# Patient Record
Sex: MALE | Race: WHITE | NOT HISPANIC OR LATINO | Employment: OTHER | URBAN - METROPOLITAN AREA
[De-identification: names, ages, dates, MRNs, and addresses within clinical notes are randomized per-mention and may not be internally consistent; named-entity substitution may affect disease eponyms.]

---

## 2017-08-11 ENCOUNTER — TRANSCRIBE ORDERS (OUTPATIENT)
Dept: ADMINISTRATIVE | Facility: HOSPITAL | Age: 47
End: 2017-08-11

## 2017-08-11 DIAGNOSIS — M25.552 LEFT HIP PAIN: Primary | ICD-10-CM

## 2017-08-17 ENCOUNTER — HOSPITAL ENCOUNTER (OUTPATIENT)
Dept: RADIOLOGY | Facility: HOSPITAL | Age: 47
Discharge: HOME | End: 2017-08-17
Payer: COMMERCIAL

## 2017-08-17 DIAGNOSIS — M25.552 LEFT HIP PAIN: ICD-10-CM

## 2017-08-17 PROCEDURE — 20610 DRAIN/INJ JOINT/BURSA W/O US: CPT

## 2017-08-17 PROCEDURE — 77002 NEEDLE LOCALIZATION BY XRAY: CPT

## 2017-08-17 RX ORDER — METHYLPREDNISOLONE ACETATE 80 MG/ML
80 INJECTION, SUSPENSION INTRA-ARTICULAR; INTRALESIONAL; INTRAMUSCULAR; SOFT TISSUE
Status: COMPLETED | OUTPATIENT
Start: 2017-08-17 | End: 2017-08-17

## 2017-08-17 RX ORDER — LIDOCAINE HYDROCHLORIDE 10 MG/ML
20 INJECTION, SOLUTION INFILTRATION; PERINEURAL
Status: COMPLETED | OUTPATIENT
Start: 2017-08-17 | End: 2017-08-17

## 2017-08-17 RX ORDER — BUPIVACAINE HYDROCHLORIDE 2.5 MG/ML
30 INJECTION, SOLUTION EPIDURAL; INFILTRATION; INTRACAUDAL
Status: COMPLETED | OUTPATIENT
Start: 2017-08-17 | End: 2017-08-17

## 2017-08-17 RX ADMIN — IOHEXOL 3 ML: 300 INJECTION, SOLUTION INTRAVENOUS at 15:00

## 2017-08-17 RX ADMIN — LIDOCAINE HYDROCHLORIDE 2 ML: 10 INJECTION, SOLUTION INFILTRATION; PERINEURAL at 15:00

## 2017-08-17 RX ADMIN — BUPIVACAINE HYDROCHLORIDE 2 ML: 2.5 INJECTION, SOLUTION EPIDURAL; INFILTRATION; INTRACAUDAL at 15:00

## 2017-08-17 RX ADMIN — METHYLPREDNISOLONE ACETATE 80 MG: 80 INJECTION, SUSPENSION INTRA-ARTICULAR; INTRALESIONAL; INTRAMUSCULAR; SOFT TISSUE at 15:00

## 2017-08-24 ENCOUNTER — ALLSCRIPTS OFFICE VISIT (OUTPATIENT)
Dept: OTHER | Facility: OTHER | Age: 47
End: 2017-08-24

## 2017-09-05 ENCOUNTER — ANESTHESIA EVENT (OUTPATIENT)
Dept: PERIOP | Facility: HOSPITAL | Age: 47
End: 2017-09-05
Payer: COMMERCIAL

## 2017-09-06 ENCOUNTER — ANESTHESIA (OUTPATIENT)
Dept: PERIOP | Facility: HOSPITAL | Age: 47
End: 2017-09-06
Payer: COMMERCIAL

## 2017-09-06 ENCOUNTER — HOSPITAL ENCOUNTER (OUTPATIENT)
Facility: HOSPITAL | Age: 47
Setting detail: OUTPATIENT SURGERY
Discharge: HOME | End: 2017-09-06
Attending: SURGERY | Admitting: SURGERY
Payer: COMMERCIAL

## 2017-09-06 VITALS
TEMPERATURE: 97 F | HEIGHT: 69 IN | WEIGHT: 150 LBS | BODY MASS INDEX: 22.22 KG/M2 | RESPIRATION RATE: 15 BRPM | OXYGEN SATURATION: 97 % | SYSTOLIC BLOOD PRESSURE: 119 MMHG | HEART RATE: 49 BPM | DIASTOLIC BLOOD PRESSURE: 80 MMHG

## 2017-09-06 DIAGNOSIS — K40.20 BILATERAL INGUINAL HERNIA WITHOUT OBSTRUCTION OR GANGRENE: ICD-10-CM

## 2017-09-06 PROCEDURE — 88302 TISSUE EXAM BY PATHOLOGIST: CPT | Performed by: SURGERY

## 2017-09-06 PROCEDURE — C1781 MESH (IMPLANTABLE): HCPCS | Performed by: SURGERY

## 2017-09-06 DEVICE — BARD MESH PERFIX PLUG, SMALL
Type: IMPLANTABLE DEVICE | Site: INGUINAL | Status: FUNCTIONAL
Brand: BARD MESH PERFIX PLUG

## 2017-09-06 RX ORDER — LIDOCAINE HYDROCHLORIDE 10 MG/ML
INJECTION, SOLUTION INFILTRATION; PERINEURAL AS NEEDED
Status: DISCONTINUED | OUTPATIENT
Start: 2017-09-06 | End: 2017-09-06 | Stop reason: SURG

## 2017-09-06 RX ORDER — MIDAZOLAM HYDROCHLORIDE 1 MG/ML
INJECTION INTRAMUSCULAR; INTRAVENOUS AS NEEDED
Status: DISCONTINUED | OUTPATIENT
Start: 2017-09-06 | End: 2017-09-06 | Stop reason: SURG

## 2017-09-06 RX ORDER — ONDANSETRON 2 MG/ML
4 INJECTION INTRAMUSCULAR; INTRAVENOUS EVERY 4 HOURS PRN
Status: DISCONTINUED | OUTPATIENT
Start: 2017-09-06 | End: 2017-09-06 | Stop reason: HOSPADM

## 2017-09-06 RX ORDER — GABAPENTIN 100 MG/1
100 CAPSULE ORAL DAILY PRN
COMMUNITY

## 2017-09-06 RX ORDER — ALPRAZOLAM 0.25 MG/1
0.12 TABLET ORAL
COMMUNITY

## 2017-09-06 RX ORDER — HYDROCODONE BITARTRATE AND ACETAMINOPHEN 5; 325 MG/1; MG/1
1 TABLET ORAL EVERY 4 HOURS PRN
Status: DISCONTINUED | OUTPATIENT
Start: 2017-09-06 | End: 2017-09-06 | Stop reason: HOSPADM

## 2017-09-06 RX ORDER — SODIUM CHLORIDE, SODIUM LACTATE, POTASSIUM CHLORIDE, CALCIUM CHLORIDE 600; 310; 30; 20 MG/100ML; MG/100ML; MG/100ML; MG/100ML
80 INJECTION, SOLUTION INTRAVENOUS CONTINUOUS
Status: DISCONTINUED | OUTPATIENT
Start: 2017-09-06 | End: 2017-09-06 | Stop reason: HOSPADM

## 2017-09-06 RX ORDER — FENTANYL CITRATE/PF 50 MCG/ML
50 SYRINGE (ML) INJECTION
Status: DISCONTINUED | OUTPATIENT
Start: 2017-09-06 | End: 2017-09-06 | Stop reason: HOSPADM

## 2017-09-06 RX ORDER — ONDANSETRON 2 MG/ML
INJECTION INTRAMUSCULAR; INTRAVENOUS AS NEEDED
Status: DISCONTINUED | OUTPATIENT
Start: 2017-09-06 | End: 2017-09-06 | Stop reason: SURG

## 2017-09-06 RX ORDER — SODIUM CHLORIDE, SODIUM LACTATE, POTASSIUM CHLORIDE, CALCIUM CHLORIDE 600; 310; 30; 20 MG/100ML; MG/100ML; MG/100ML; MG/100ML
75 INJECTION, SOLUTION INTRAVENOUS CONTINUOUS
Status: DISCONTINUED | OUTPATIENT
Start: 2017-09-06 | End: 2017-09-06 | Stop reason: HOSPADM

## 2017-09-06 RX ORDER — HYDROCODONE BITARTRATE AND ACETAMINOPHEN 5; 325 MG/1; MG/1
1 TABLET ORAL EVERY 4 HOURS PRN
Qty: 30 TABLET | Refills: 0 | Status: SHIPPED | OUTPATIENT
Start: 2017-09-06

## 2017-09-06 RX ORDER — MORPHINE SULFATE 4 MG/ML
2 INJECTION, SOLUTION INTRAMUSCULAR; INTRAVENOUS EVERY 2 HOUR PRN
Status: DISCONTINUED | OUTPATIENT
Start: 2017-09-06 | End: 2017-09-06 | Stop reason: HOSPADM

## 2017-09-06 RX ORDER — ACETAMINOPHEN 325 MG/1
650 TABLET ORAL EVERY 6 HOURS PRN
Status: DISCONTINUED | OUTPATIENT
Start: 2017-09-06 | End: 2017-09-06 | Stop reason: HOSPADM

## 2017-09-06 RX ORDER — FENTANYL CITRATE 50 UG/ML
INJECTION, SOLUTION INTRAMUSCULAR; INTRAVENOUS AS NEEDED
Status: DISCONTINUED | OUTPATIENT
Start: 2017-09-06 | End: 2017-09-06 | Stop reason: SURG

## 2017-09-06 RX ORDER — PROPOFOL 10 MG/ML
INJECTION, EMULSION INTRAVENOUS AS NEEDED
Status: DISCONTINUED | OUTPATIENT
Start: 2017-09-06 | End: 2017-09-06 | Stop reason: SURG

## 2017-09-06 RX ORDER — GLYCOPYRROLATE 0.2 MG/ML
INJECTION INTRAMUSCULAR; INTRAVENOUS AS NEEDED
Status: DISCONTINUED | OUTPATIENT
Start: 2017-09-06 | End: 2017-09-06 | Stop reason: SURG

## 2017-09-06 RX ORDER — SODIUM CHLORIDE 9 MG/ML
125 INJECTION, SOLUTION INTRAVENOUS CONTINUOUS
Status: DISCONTINUED | OUTPATIENT
Start: 2017-09-06 | End: 2017-09-06 | Stop reason: HOSPADM

## 2017-09-06 RX ORDER — ROCURONIUM BROMIDE 10 MG/ML
INJECTION, SOLUTION INTRAVENOUS AS NEEDED
Status: DISCONTINUED | OUTPATIENT
Start: 2017-09-06 | End: 2017-09-06 | Stop reason: SURG

## 2017-09-06 RX ORDER — MEPERIDINE HYDROCHLORIDE 50 MG/ML
12.5 INJECTION INTRAMUSCULAR; INTRAVENOUS; SUBCUTANEOUS AS NEEDED
Status: DISCONTINUED | OUTPATIENT
Start: 2017-09-06 | End: 2017-09-06 | Stop reason: HOSPADM

## 2017-09-06 RX ORDER — KETOROLAC TROMETHAMINE 30 MG/ML
INJECTION, SOLUTION INTRAMUSCULAR; INTRAVENOUS AS NEEDED
Status: DISCONTINUED | OUTPATIENT
Start: 2017-09-06 | End: 2017-09-06 | Stop reason: SURG

## 2017-09-06 RX ADMIN — FENTANYL CITRATE 50 MCG: 50 INJECTION INTRAMUSCULAR; INTRAVENOUS at 11:33

## 2017-09-06 RX ADMIN — PROPOFOL 200 MG: 10 INJECTION, EMULSION INTRAVENOUS at 10:05

## 2017-09-06 RX ADMIN — LIDOCAINE HYDROCHLORIDE 100 MG: 10 INJECTION, SOLUTION INFILTRATION; PERINEURAL at 10:05

## 2017-09-06 RX ADMIN — NEOSTIGMINE METHYLSULFATE 4 MG: 1 INJECTION, SOLUTION INTRAMUSCULAR; INTRAVENOUS; SUBCUTANEOUS at 11:02

## 2017-09-06 RX ADMIN — SODIUM CHLORIDE 125 ML/HR: 0.9 INJECTION, SOLUTION INTRAVENOUS at 08:16

## 2017-09-06 RX ADMIN — CEFAZOLIN SODIUM 1000 MG: 1 SOLUTION INTRAVENOUS at 10:12

## 2017-09-06 RX ADMIN — KETOROLAC TROMETHAMINE 30 MG: 30 INJECTION, SOLUTION INTRAMUSCULAR at 10:39

## 2017-09-06 RX ADMIN — HYDROCODONE BITARTRATE AND ACETAMINOPHEN 1 TABLET: 5; 325 TABLET ORAL at 12:51

## 2017-09-06 RX ADMIN — GLYCOPYRROLATE 0.8 MG: 0.2 INJECTION, SOLUTION INTRAMUSCULAR; INTRAVENOUS at 11:02

## 2017-09-06 RX ADMIN — MIDAZOLAM HYDROCHLORIDE 2 MG: 1 INJECTION, SOLUTION INTRAMUSCULAR; INTRAVENOUS at 10:02

## 2017-09-06 RX ADMIN — FENTANYL CITRATE 50 MCG: 50 INJECTION INTRAMUSCULAR; INTRAVENOUS at 11:40

## 2017-09-06 RX ADMIN — SODIUM CHLORIDE: 0.9 INJECTION, SOLUTION INTRAVENOUS at 10:41

## 2017-09-06 RX ADMIN — FENTANYL CITRATE 50 MCG: 50 INJECTION INTRAMUSCULAR; INTRAVENOUS at 11:46

## 2017-09-06 RX ADMIN — ROCURONIUM BROMIDE 50 MG: 10 INJECTION, SOLUTION INTRAVENOUS at 10:07

## 2017-09-06 RX ADMIN — DEXAMETHASONE SODIUM PHOSPHATE 4 MG: 10 INJECTION INTRAMUSCULAR; INTRAVENOUS at 10:39

## 2017-09-06 RX ADMIN — FENTANYL CITRATE 100 MCG: 50 INJECTION, SOLUTION INTRAMUSCULAR; INTRAVENOUS at 10:05

## 2017-09-06 RX ADMIN — SODIUM CHLORIDE: 0.9 INJECTION, SOLUTION INTRAVENOUS at 11:11

## 2017-09-06 RX ADMIN — ONDANSETRON HYDROCHLORIDE 4 MG: 2 INJECTION, SOLUTION INTRAVENOUS at 11:00

## 2017-09-19 ENCOUNTER — ALLSCRIPTS OFFICE VISIT (OUTPATIENT)
Dept: OTHER | Facility: OTHER | Age: 47
End: 2017-09-19

## 2017-11-13 ENCOUNTER — ALLSCRIPTS OFFICE VISIT (OUTPATIENT)
Dept: OTHER | Facility: OTHER | Age: 47
End: 2017-11-13

## 2017-11-13 ENCOUNTER — TRANSCRIBE ORDERS (OUTPATIENT)
Dept: ADMINISTRATIVE | Facility: HOSPITAL | Age: 47
End: 2017-11-13

## 2017-11-13 ENCOUNTER — HOSPITAL ENCOUNTER (OUTPATIENT)
Dept: RADIOLOGY | Facility: HOSPITAL | Age: 47
Discharge: HOME | End: 2017-11-13
Payer: COMMERCIAL

## 2017-11-13 DIAGNOSIS — M54.89 OTHER BACK PAIN, UNSPECIFIED CHRONICITY: Primary | ICD-10-CM

## 2017-11-13 DIAGNOSIS — M54.89 OTHER BACK PAIN, UNSPECIFIED CHRONICITY: ICD-10-CM

## 2017-11-13 PROCEDURE — 72110 X-RAY EXAM L-2 SPINE 4/>VWS: CPT

## 2017-11-14 NOTE — PROGRESS NOTES
Assessment    1  History of bilateral inguinal hernias (V45 89) (Z98 890,Z87 19)    Discussion/Summary    30-year-old male status post bilateral inguinal hernia  His exam is negative, despite a little bit of left groin pain  normal activities  Reassurance given  back on an as-needed basis  Chief Complaint  Patient is here for a post op sx Bilateral Hernia repair- 9/6/2017  Patient said he still have some pain and its an annoying pain  Patient said he also notice some swelling on the left quadrant  Post-Op  HPI: 30-year-old male who is status post bilateral inguinal hernia repairs, open with mesh on September 6, 2017  Left was fixed with a plug and patch and right with just a flat mesh  Left was more substantial in size preoperatively  He has gradually increased his activity but is still doing significantly less exercising than his baseline  notes a little bit of cutaneous numbness in his groin bilaterally and comes in for some reassurance that it is okay for him to continue his normal exercise regimen  is eating, drinking, urinating, moving his bowels without difficulty  Review of Systems   Constitutional: no fever-- and-- no chills  Eyes: no eye pain  ENT: no nosebleeds-- and-- no nasal discharge  Cardiovascular: no chest pain  Respiratory: no cough  Gastrointestinal: no nausea  Genitourinary: no urinary hesitancy  Musculoskeletal: no joint swelling  Integumentary: no itching  Neurological: no numbness  Psychiatric: no anxiety  Endocrine: no muscle weakness  Hematologic/Lymphatic: no tendency for easy bleeding  Active Problems  1  Anxiety (300 00) (F41 9)    Social History     · Alcohol drinker (V49 89) (Z78 9)   · Former smoker (Y08 90) (K71 528)   · No illicit drug use  The social history was reviewed and updated today  Current Meds   1  ALPRAZolam 0 25 MG Oral Tablet; TAKE 1 TABLET EVERY 6 HOURS AS NEEDED FOR ANXIETY; Therapy: (Jesús De La Garza) to Recorded   2  Gabapentin 300 MG Oral Capsule; Therapy: [id number] to Recorded    Allergies  1  No Known Drug Allergies    Vitals   Recorded: [id number] 02:58PM   Temperature 98 F, Tympanic   Systolic 777, RUE, Sitting   Diastolic 77, RUE, Sitting   Height 5 ft 9 in   Weight 148 lb 4 oz   BMI Calculated 21 89   BSA Calculated 1 82       Physical Exam   Constitutional 22-year-old well-appearing thin male  No acute distress  Ambulating without difficulty throughout the office  Pulmonary  Respiratory effort: No increased work of breathing or signs of respiratory distress  Auscultation of lungs: Clear to auscultation, equal breath sounds bilaterally, no wheezes, no rales, no rhonci  Cardiovascular  Auscultation of heart: Normal rate and rhythm, normal S1 and S2, without murmurs  Musculoskeletal  Gait and station: Normal    Skin  Skin and subcutaneous tissue: Normal without rashes or lesions          Signatures   Electronically signed by : Hien Barrios [facility]; Nov 13 2017  3:26PM EST                       (Author)    Electronically signed by : Thomas Bell MD; Nov 13 2017  3:28PM EST                       (Author)

## 2017-12-04 ENCOUNTER — TRANSCRIBE ORDERS (OUTPATIENT)
Dept: ADMINISTRATIVE | Facility: HOSPITAL | Age: 47
End: 2017-12-04

## 2017-12-04 DIAGNOSIS — D51.8 OTHER VITAMIN B12 DEFICIENCY ANEMIA: ICD-10-CM

## 2017-12-04 DIAGNOSIS — Z79.899 NEED FOR PROPHYLACTIC CHEMOTHERAPY: ICD-10-CM

## 2017-12-04 DIAGNOSIS — M79.10 MYALGIA: ICD-10-CM

## 2017-12-04 DIAGNOSIS — M54.9 MID BACK PAIN: Primary | ICD-10-CM

## 2017-12-04 DIAGNOSIS — E86.0 DEHYDRATION: ICD-10-CM

## 2017-12-04 DIAGNOSIS — M54.5 ACUTE MIDLINE LOW BACK PAIN, WITH SCIATICA PRESENCE UNSPECIFIED: ICD-10-CM
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
## 2017-12-04 DIAGNOSIS — E55.9 AVITAMINOSIS D: ICD-10-CM

## 2017-12-04 DIAGNOSIS — E78.5 HYPERLIPIDEMIA, UNSPECIFIED HYPERLIPIDEMIA TYPE: ICD-10-CM

## 2017-12-04 DIAGNOSIS — S30.860A NONVENOMOUS INSECT BITE OF BUTTOCK WITHOUT INFECTION, INITIAL ENCOUNTER: ICD-10-CM

## 2017-12-04 DIAGNOSIS — R53.83 OTHER FATIGUE: ICD-10-CM

## 2017-12-13 ENCOUNTER — HOSPITAL ENCOUNTER (OUTPATIENT)
Dept: RADIOLOGY | Age: 47
Discharge: HOME | End: 2017-12-13
Payer: COMMERCIAL

## 2017-12-13 ENCOUNTER — GENERIC CONVERSION - ENCOUNTER (OUTPATIENT)
Dept: OTHER | Facility: OTHER | Age: 47
End: 2017-12-13

## 2017-12-13 DIAGNOSIS — M54.5 ACUTE MIDLINE LOW BACK PAIN, WITH SCIATICA PRESENCE UNSPECIFIED: ICD-10-CM

## 2017-12-13 DIAGNOSIS — M54.9 MID BACK PAIN: ICD-10-CM

## 2017-12-13 PROCEDURE — 72148 MRI LUMBAR SPINE W/O DYE: CPT

## 2017-12-13 PROCEDURE — 72146 MRI CHEST SPINE W/O DYE: CPT

## 2017-12-28 ENCOUNTER — APPOINTMENT (OUTPATIENT)
Dept: LAB | Facility: CLINIC | Age: 47
End: 2017-12-28
Payer: COMMERCIAL
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
## 2017-12-28 DIAGNOSIS — D51.8 OTHER VITAMIN B12 DEFICIENCY ANEMIA: ICD-10-CM

## 2017-12-28 DIAGNOSIS — E78.5 HYPERLIPIDEMIA, UNSPECIFIED HYPERLIPIDEMIA TYPE: ICD-10-CM

## 2017-12-28 DIAGNOSIS — M54.9 MID BACK PAIN: ICD-10-CM

## 2017-12-28 DIAGNOSIS — M79.10 MYALGIA: ICD-10-CM

## 2017-12-28 DIAGNOSIS — E86.0 DEHYDRATION: ICD-10-CM

## 2017-12-28 DIAGNOSIS — M54.5 ACUTE MIDLINE LOW BACK PAIN, WITH SCIATICA PRESENCE UNSPECIFIED: ICD-10-CM

## 2017-12-28 DIAGNOSIS — S30.860A NONVENOMOUS INSECT BITE OF BUTTOCK WITHOUT INFECTION, INITIAL ENCOUNTER: ICD-10-CM

## 2017-12-28 DIAGNOSIS — E55.9 AVITAMINOSIS D: ICD-10-CM

## 2017-12-28 DIAGNOSIS — R53.83 OTHER FATIGUE: ICD-10-CM

## 2017-12-28 DIAGNOSIS — Z79.899 NEED FOR PROPHYLACTIC CHEMOTHERAPY: ICD-10-CM

## 2017-12-28 LAB
25(OH)D3 SERPL-MCNC: 12.8 NG/ML (ref 30–100)
ALBUMIN SERPL BCP-MCNC: 4.1 G/DL (ref 3.5–5)
ALP SERPL-CCNC: 55 U/L (ref 46–116)
ALT SERPL W P-5'-P-CCNC: 37 U/L (ref 12–78)
ANION GAP SERPL CALCULATED.3IONS-SCNC: 3 MMOL/L (ref 4–13)
AST SERPL W P-5'-P-CCNC: 23 U/L (ref 5–45)
BASOPHILS # BLD AUTO: 0.02 THOUSANDS/ΜL (ref 0–0.1)
BASOPHILS NFR BLD AUTO: 0 % (ref 0–1)
BILIRUB SERPL-MCNC: 0.9 MG/DL (ref 0.2–1)
BUN SERPL-MCNC: 14 MG/DL (ref 5–25)
CALCIUM SERPL-MCNC: 9.3 MG/DL (ref 8.3–10.1)
CHLORIDE SERPL-SCNC: 107 MMOL/L (ref 100–108)
CHOLEST SERPL-MCNC: 192 MG/DL (ref 50–200)
CO2 SERPL-SCNC: 31 MMOL/L (ref 21–32)
CREAT SERPL-MCNC: 1.23 MG/DL (ref 0.6–1.3)
EOSINOPHIL # BLD AUTO: 0.35 THOUSAND/ΜL (ref 0–0.61)
EOSINOPHIL NFR BLD AUTO: 5 % (ref 0–6)
ERYTHROCYTE [DISTWIDTH] IN BLOOD BY AUTOMATED COUNT: 12.3 % (ref 11.6–15.1)
ERYTHROCYTE [SEDIMENTATION RATE] IN BLOOD: 0 MM/HOUR (ref 0–10)
GFR SERPL CREATININE-BSD FRML MDRD: 70 ML/MIN/1.73SQ M
GLUCOSE P FAST SERPL-MCNC: 91 MG/DL (ref 65–99)
HCT VFR BLD AUTO: 46.8 % (ref 36.5–49.3)
HDLC SERPL-MCNC: 99 MG/DL (ref 40–60)
HGB BLD-MCNC: 15.7 G/DL (ref 12–17)
LDLC SERPL CALC-MCNC: 81 MG/DL (ref 0–100)
LYMPHOCYTES # BLD AUTO: 1.78 THOUSANDS/ΜL (ref 0.6–4.47)
LYMPHOCYTES NFR BLD AUTO: 25 % (ref 14–44)
MCH RBC QN AUTO: 29.6 PG (ref 26.8–34.3)
MCHC RBC AUTO-ENTMCNC: 33.5 G/DL (ref 31.4–37.4)
MCV RBC AUTO: 88 FL (ref 82–98)
MONOCYTES # BLD AUTO: 0.81 THOUSAND/ΜL (ref 0.17–1.22)
MONOCYTES NFR BLD AUTO: 11 % (ref 4–12)
NEUTROPHILS # BLD AUTO: 4.24 THOUSANDS/ΜL (ref 1.85–7.62)
NEUTS SEG NFR BLD AUTO: 59 % (ref 43–75)
PLATELET # BLD AUTO: 194 THOUSANDS/UL (ref 149–390)
PMV BLD AUTO: 11.4 FL (ref 8.9–12.7)
POTASSIUM SERPL-SCNC: 4.7 MMOL/L (ref 3.5–5.3)
PROT SERPL-MCNC: 7.2 G/DL (ref 6.4–8.2)
RBC # BLD AUTO: 5.3 MILLION/UL (ref 3.88–5.62)
SODIUM SERPL-SCNC: 141 MMOL/L (ref 136–145)
T3FREE SERPL-MCNC: 3.34 PG/ML (ref 2.3–4.2)
T4 FREE SERPL-MCNC: 1.12 NG/DL (ref 0.76–1.46)
TRIGL SERPL-MCNC: 59 MG/DL
TSH SERPL DL<=0.05 MIU/L-ACNC: 2.06 UIU/ML (ref 0.36–3.74)
URATE SERPL-MCNC: 4.7 MG/DL (ref 4.2–8)
VIT B12 SERPL-MCNC: 444 PG/ML (ref 100–900)
WBC # BLD AUTO: 7.2 THOUSAND/UL (ref 4.31–10.16)

## 2017-12-28 PROCEDURE — 84439 ASSAY OF FREE THYROXINE: CPT

## 2017-12-28 PROCEDURE — 82607 VITAMIN B-12: CPT

## 2017-12-28 PROCEDURE — 85652 RBC SED RATE AUTOMATED: CPT

## 2017-12-28 PROCEDURE — 36415 COLL VENOUS BLD VENIPUNCTURE: CPT

## 2017-12-28 PROCEDURE — 80061 LIPID PANEL: CPT

## 2017-12-28 PROCEDURE — 84443 ASSAY THYROID STIM HORMONE: CPT

## 2017-12-28 PROCEDURE — 84550 ASSAY OF BLOOD/URIC ACID: CPT

## 2017-12-28 PROCEDURE — 85025 COMPLETE CBC W/AUTO DIFF WBC: CPT

## 2017-12-28 PROCEDURE — 84481 FREE ASSAY (FT-3): CPT

## 2017-12-28 PROCEDURE — 82306 VITAMIN D 25 HYDROXY: CPT

## 2017-12-28 PROCEDURE — 86618 LYME DISEASE ANTIBODY: CPT

## 2017-12-28 PROCEDURE — 80053 COMPREHEN METABOLIC PANEL: CPT

## 2017-12-29 LAB
B BURGDOR IGG SER IA-ACNC: 0.08
B BURGDOR IGM SER IA-ACNC: 0.69

## 2018-01-13 VITALS
TEMPERATURE: 97.8 F | SYSTOLIC BLOOD PRESSURE: 118 MMHG | BODY MASS INDEX: 21.45 KG/M2 | WEIGHT: 144.8 LBS | HEIGHT: 69 IN | HEART RATE: 80 BPM | DIASTOLIC BLOOD PRESSURE: 78 MMHG

## 2018-01-14 VITALS
WEIGHT: 148.25 LBS | SYSTOLIC BLOOD PRESSURE: 120 MMHG | BODY MASS INDEX: 21.96 KG/M2 | TEMPERATURE: 98 F | DIASTOLIC BLOOD PRESSURE: 77 MMHG | HEIGHT: 69 IN

## 2018-01-14 NOTE — MISCELLANEOUS
Message   Recorded as Task   Date: 09/07/2017 10:26 AM, Created By: Patricio Turner   Task Name: Follow Up   [facility]   Regarding Patient: Dell Hanna, Status: Active   CommentMaylin Rubina - 07 Sep 2017 10:26 AM     TASK CREATED  Called pt post B/L Ing  Hernia repair from yesterday, message left to return call for update  Patricio Turner - 07 Sep 2017 10:52 AM     TASK EDITED  Spoke w/ pt and he is doing well, c/o pain but is taking his vicodin q4 and also applying ice  States he noticed edema yesterday but today this has gotten better  Is voiding but does feel he is not empting his bladder completely  Will continue to force fluids and gradually build up his diet  Advised to ambulate frequently and deep breath throughout the day  Has not moved bowels yet and is concerned w/ trying to evacuate, advised to take the stool softners to aid with this as well as combat constipation w/ taking the medications  Pt stated the vicodin helps take some of the edge off but does not relieve the pain entirely, advised to try one extra strength apap along w/ the vicodin to see if this helps better and if unable to aid with pain releif to contact office  Has not moved bowels yet has no N/V  Drsg is dry and intact and post op f/u is set up for 9/19  Pathology pending at this time  Fernando Canchola - 11 Sep 2017 2:24 PM     TASK EDITED  Received on pathology results and waiting for verification  Jackie Lu - 15 Sep 2017 3:45 PM     TASK EDITED  Provider has verified pathology     please contact patient with normal pathology results  Valeria Clarke - 18 Sep 2017 8:30 AM     TASK EDITED  Left message to return call  Patricio Turner - 18 Sep 2017 8:45 AM     TASK EDITED  Shanna Avilez w/ pt and made aware of pathology results  Active Problems    1  Anxiety (300 00) (F41 9)   2  Bilateral inguinal hernia (550 92) (K40 20)    Current Meds   1   ALPRAZolam 0 25 MG Oral Tablet; TAKE 1 TABLET EVERY 6 HOURS AS NEEDED FOR   ANXIETY; Therapy: (Brennon Levy) to Recorded   2  Gabapentin 300 MG Oral Capsule; Therapy: [id number] to Recorded    Allergies    1   No Known Drug Allergies    Signatures   Electronically signed by : Ivana Ellis, ; Sep 18 2017  8:45AM EST                       (Author)

## 2018-01-15 ENCOUNTER — ALLSCRIPTS OFFICE VISIT (OUTPATIENT)
Dept: OTHER | Facility: OTHER | Age: 48
End: 2018-01-15

## 2018-01-15 DIAGNOSIS — M54.50 LOW BACK PAIN: ICD-10-CM

## 2018-01-16 NOTE — PROGRESS NOTES
Assessment   1  Chronic left-sided low back pain without sciatica (724 2,338 29) (M54 5,G89 29)    Plan   Chronic left-sided low back pain without sciatica    · Meloxicam 7 5 MG Oral Tablet (Mobic); TAKE 1 TABLET DAILY AS NEEDED   · *1 - [facility]  Evaluate and treat left piriforimis muscle    stretching and strengthening  Status: Active  Requested for: [id number]  Care Summary provided  : Yes    Discussion/Summary   Patient discussion: discussed with the patient  Patient seen and examined by Dr Shane Kinney and myself  MRI of the lumbar and thoracic spine reviewed and shows lumbar DDD without significant disc bulging  His pain likely stems from piriformis syndrome on the left side  We will refer the patient to physical therapy for evaluation and treatment  No surgical intervention at this time  Start Mobic PRN  He can follow-up with us PRN  Any questions call the office  Patient is able to Self-Care  The treatment plan was reviewed with the patient/guardian  The patient/guardian understands and agrees with the treatment plan      Chief Complaint   1  Back Pain  LBP      History of Present Illness   HPI: The patient is a 41year old male who presents for an initial evaluation with Dr Shane Kinney  Today he presents as a self-referral for chronic long-standing left lower back pain and upper thoracic muscle soreness  There is no radiation of pain to the upper or lower extremities, no associated numbness/tingling or weakness  Denies bowel or bladder incontinence  There was no injury, no history of back surgery  This has been worsening over the last several months, and physical labor makes it worse, yoga makes it better  He has seen a chiropractor years ago with some improvement  Back Pain: CAROLINA ZHANG presents with complaints of back pain        Associated symptoms include spasm-- and-- stiffness, but-- no fever,-- no night sweats,-- no leg numbness,-- no arm weakness,-- no leg weakness,-- no urinary incontinence,-- no fecal incontinence-- and-- no urinary retention  Review of Systems        Constitutional: no fever-- and-- no chills  Cardiovascular: No complaints of chest pain, no palpitations, no leg claudication or lower extremity edema  Respiratory: No complaints of shortness of breath, no wheezing, no cough  Gastrointestinal: No complaints of abdominal pain, no constipation, no nausea or vomiting, no diarrhea or bloody stools  Genitourinary: no incontinence  Musculoskeletal: arthralgias-- and-- joint stiffness, but-- no limb pain  Integumentary: No complaints of skin rash or lesion, no itching or dry skin, no skin wounds  Neurological: no numbness-- and-- no tingling  ROS reviewed  Active Problems   1  Anxiety (300 00) (F41 9)    Past Medical History    · History of bilateral inguinal hernias (V45 89) (Z98 890,Z87 19)   · History of chronic back pain (V13 59) (Z87 39)   · History of psoriasis (V13 3) (Z87 2)   · History of sciatica (V12 49) (Z86 69)   · History of urinary frequency (V13 09) (G72 063)   · History of Osteoarthritis (715 90) (M19 90)     The active problems and past medical history were reviewed and updated today  Surgical History    · History of Oral Surgery Tooth Extraction [place] Tooth   · History of Surgery Vas Deferens Vasectomy     The surgical history was reviewed and updated today  Family History   Maternal Half Sister    · Family history of malignant neoplasm of breast (V16 3) (Z80 3)     The family history was reviewed and updated today  Social History    · Alcohol drinker (V49 89) (Z78 9)   · Former smoker (B59 23) (Q78 378)   · No illicit drug use  The social history was reviewed and updated today  Current Meds    1  ALPRAZolam 0 25 MG Oral Tablet; TAKE 1 TABLET EVERY 6 HOURS AS NEEDED FOR     ANXIETY; Therapy: (Claudeen Hare) to Recorded   2   Gabapentin 300 MG Oral Capsule; Therapy: [id number] to Recorded     The medication list was reviewed and updated today  Allergies   1  No Known Drug Allergies    Vitals   Signs   Heart Rate: 69  Systolic: 844  Diastolic: 79  Height: 5 ft 9 in  Weight: 148 lb   BMI Calculated: 21 86  BSA Calculated: 1 82    Physical Exam        Constitutional - General appearance: Normal       Musculoskeletal - Gait and station: Normal -- Inspection/palpation of joints, bones, and muscles: Normal -- Muscle strength/tone: Normal -- Upper extremity compartments: Normal -- Lower extremity compartments: Normal       Cardiovascular - Pulses: Normal       Respiratory - Lungs - Clear to auscultation bilaterally, no rales, no rhonci, no wheezes  Abdomen - Abdomen - Soft, nontender, nondistended  Lymphatic - Palpation of lymph nodes in other areas: Normal       Skin - Skin and subcutaneous tissue: Normal       Neurologic - Sensation: Normal -- Upper extremity peripheral neuro exam: Normal -- Lower extremity peripheral neuro exam: Normal       Psychiatric - Orientation to person, place, and time: Normal -- Mood and affect: Normal       Eyes      Conjunctiva and lids: Normal        Pupils and irises: Normal        Free Text - NAD  Gait is normal  Inspection no open wounds or erythema  NTTP over the thoracic and lumbosacral region, NTTP surrounding paraspinal musculature  Negative straight leg raise bilaterally  Negative VANESSA bilaterally  No pain with internal rotation of the hips  Strength is 5/5 BL LE, L2-S1  Sensation equal and intact  Feet are warm and well perfused  No calf tenderness or pitting edema  Attending Note   Attending Note [facility]: Level of Participation: I was present in clinic and examined the patient  Patient's History: Patient presents for evaluation of chronic upper middle and lower back pain  He also reports left buttock pain  He denies any specific radiation distally into his legs  Denies any weakness   Does not report incontinence of bowel or bladder  He has never tried formal physical therapy program  Several years ago he did visit with a chiropractor with transient relief of symptoms  His pain is not debilitating  He has difficulty with prolonged sitting particularly with the left buttock  He has received an MRI of the thoracic and lumbar spine and has been diagnosed with DDD without significant stenosis  Key Parts of the Exam: Awake alert and oriented x3  Affect is appropriate  Gait is normal  No significant reproducible tenderness to palpation over the thoracolumbar spine or piriformis fossa  Negative modified straight leg raise bilaterally  Negative Tam's test bilaterally  5/5 strength L2-S1 bilaterally  and lumbar spine MRIs demonstrate mild DDD no significant stenosis  He does have some loss of lordosis of the lumbar spine  Diagnosis and Plan: Mild DDD in the thoracic and lumbar spine  No stenosis  No spondylolisthesis  Myofascial pain syndrome  No gross neurological deficits  Physical therapy  Mobic  Follow-up p r n        Signatures    Electronically signed by : Tana Bonilla, [facility]; Mt 15 2018 10:45AM EST                       (Author)     Electronically signed by : JOSE Grayson ; Mt 15 2018 11:28AM EST                       (Author)

## 2018-01-22 VITALS
TEMPERATURE: 98 F | RESPIRATION RATE: 16 BRPM | HEART RATE: 80 BPM | WEIGHT: 143.13 LBS | SYSTOLIC BLOOD PRESSURE: 120 MMHG | BODY MASS INDEX: 21.2 KG/M2 | HEIGHT: 69 IN | DIASTOLIC BLOOD PRESSURE: 77 MMHG

## 2018-01-22 VITALS
HEIGHT: 69 IN | DIASTOLIC BLOOD PRESSURE: 79 MMHG | WEIGHT: 148 LBS | BODY MASS INDEX: 21.92 KG/M2 | SYSTOLIC BLOOD PRESSURE: 116 MMHG | HEART RATE: 69 BPM

## 2018-01-30 ENCOUNTER — EVALUATION (OUTPATIENT)
Dept: PHYSICAL THERAPY | Facility: CLINIC | Age: 48
End: 2018-01-30
Payer: COMMERCIAL

## 2018-01-30 DIAGNOSIS — G89.29 CHRONIC LEFT-SIDED LOW BACK PAIN WITHOUT SCIATICA: ICD-10-CM

## 2018-01-30 DIAGNOSIS — M54.50 CHRONIC LEFT-SIDED LOW BACK PAIN WITHOUT SCIATICA: ICD-10-CM

## 2018-01-30 DIAGNOSIS — M54.50 LOW BACK PAIN: Primary | ICD-10-CM

## 2018-01-30 PROCEDURE — G8981 BODY POS CURRENT STATUS: HCPCS | Performed by: PHYSICAL THERAPIST

## 2018-01-30 PROCEDURE — 97162 PT EVAL MOD COMPLEX 30 MIN: CPT | Performed by: PHYSICAL THERAPIST

## 2018-01-30 PROCEDURE — G8982 BODY POS GOAL STATUS: HCPCS | Performed by: PHYSICAL THERAPIST

## 2018-01-30 NOTE — PROGRESS NOTES
PT Evaluation     Today's date: 2018  Patient name: Wanda Diaz  : 1970  MRN: [id number]  Referring provider: Velasquez Lyle MD  Dx:   Encounter Diagnosis   Name Primary?  Low back pain Yes                  Assessment  Impairments: abnormal coordination, impaired physical strength and pain with function    Assessment details: Pt presents with signs and symptoms synonymous of admitting diagnosis as well as mechanical assessment of flexion bias  Pt presents with pain, decreased strength, decreased range, flexibility, as well as tolerance to activity and postural awareness  Pt would benefit skilled PT intervention in order to address these impairments in order to be able to perform all desired activities with minimal to nil symptom exacerbation  Thank you very much for this referral      Understanding of Dx/Px/POC: good   Prognosis: good    Goals  STG 4:  Decrease pain at worst to 5  Improve range to SLR to 60  Improve strength to TA draw 10" x 2, hip to 4-  Independent with HEP  LTG 8:  Decrease pain at worst to 2/10  Improve range to Hip Ir/ER to equal that of R  Improve strength to TA draw 10" x 4, hip to 4/5      Able to perform all desired activities with minimal to nil symptom exacerbation      Plan    Planned modality interventions: cryotherapy, TENS and thermotherapy: hydrocollator packs  Planned therapy interventions: manual therapy, abdominal trunk stabilization, body mechanics training, flexibility, functional ROM exercises, home exercise program, graded exercise, therapeutic training, therapeutic exercise, therapeutic activities, stretching, strengthening, patient education and neuromuscular re-education  Frequency: 2x week  Duration in weeks: 8  Treatment plan discussed with: patient        Subjective Evaluation    History of Present Illness  Date of onset: 2018  Mechanism of injury: Pt is a 42 yo male who presents today stating he has a long history of low back pain, but recently he stated he has recently had a flare up a few months ago and chose to see a physician  Pt reports that he was diagnosed with Piriformis syndrome after imagery also demonstrated degenerative properties of his low back  Pt reports that he does find relief from relief  Pt reports that his pain levels fluctuate from nothing at all and up to 7/10 within the low back and L hip during higher level of activity consisting heavier activities such as yard and contractor work  Pt reports that he does not have change in bowel or bladder, no radicular symptoms as well as numbness or tingling  Pt reports that his goals are to improve his symptoms and be able to stay active by learning some new exercises  Quality of life: fair    Pain  Current pain ratin  At best pain ratin  At worst pain ratin  Quality: burning, dull ache, pressure, sharp and discomfort  Relieving factors: change in position and heat  Aggravating factors: nothing, sitting and standing  Progression: no change      Diagnostic Tests  X-ray: abnormal (decreased lordosis)  MRI studies: abnormal (degenerative properties )        Objective     Passive Range of Motion   Left Hip   Flexion: 105 degrees   Extension: 10 degrees   External rotation (90/90): 35 degrees   Internal rotation (90/90): 45 degrees     Right Hip   Flexion: 120 degrees   Extension: 10 degrees   External rotation (90/90): 35 degrees   Internal rotation (90/90): 45 degrees     Additional Passive Range of Motion Details  Forward head, rounded shoulders, decrease Lordosis  B/L Sensation intact to L3,4,5,S1,S2  Repeated motion   Flex 65 mild L posterior knee irritation  Ext 15 some pain low back  SB R 15 mild pull on L Side  SB L 15 pinch on L side  LE Screen Strong and Painless B/L   Hip 3+ on L flex, Abd, 4- Add and Ext, 5/5 on R  Hip R 5/5 on both sides  TA draw 7" before form failure      Joint Mobility Grade 5 L4-5-S1, Grade 4 L1-3  Palpation Pain with L L3,4,5, S1, pain along GT , mild Ic  Sts: SLR + L, 55, (+) Distraction B/L, + FADIR L, + Brian B/L   (-) ELY B/L            Precautions: Nil, P P 1 Unit max    Daily Treatment Diary     Manual  1/30            R hip Distraction Grade 2-4             R hip PROM Flex/IR/ER             DTM to Glut Med region L                                           Exercise Diary              TA Draw PPT 10" x 10            Seated Flexion to pball (nv) 6" 10-15            ITB and Ham ST L 30" x 4            Piriformis ST L 30" x 4            Quad UE/LE Alternate             Pball sit up + reverse             Squat trial             Gastroc ST with Wedge B/L             Multi Rots RTB/GTB                                                                                                                                                               Modalities              CP/HP prn

## 2018-02-19 NOTE — PROGRESS NOTES
PT Discharge    Today's date: 2018  Patient name: Kaci Welch  : 1970  MRN: [id number]  Referring provider: Tabatha Castillo MD  Dx:   Encounter Diagnosis     ICD-10-CM    1  Low back pain M54 5 [facility]     PT plan of care cert/re-cert   2  Chronic left-sided low back pain without sciatica M54 5     G89 29        Start Time: 0910  Stop Time: 1000  Total time in clinic (min): 50 minutes    Assessment/Plan Pt has not bee back since his Ie which was on 18  Pt's chart will be dc in compliance of facility policy as all charts are dc within 30 days of last scheduled visit        Subjective    Objective    Flowsheet Rows    Flowsheet Row Most Recent Value   PT/OT G-Codes   Current Score  70   Projected Score  75   FOTO information reviewed  Yes   Assessment Type  Evaluation   G code set  Changing & Maintaining Body Position   Changing and Maintaining Body Position Current Status ()  CI   Changing and Maintaining Body Position Goal Status ()  CH

## 2018-04-05 ENCOUNTER — TELEPHONE (OUTPATIENT)
Dept: SURGERY | Facility: CLINIC | Age: 48
End: 2018-04-05

## 2018-04-05 NOTE — TELEPHONE ENCOUNTER
Pt calling had LIH surgery in September of 2017  States he continues with pain and edema  Pain ranges any where from 5-8 and the edema has never gone away  Requested appt for re-check, offered appt this week pt declined and appt was set up for 4/10 at his request  Pt stated he has spoken with his pcp and was told that he should f/u with surgeon

## 2018-04-06 RX ORDER — GABAPENTIN 300 MG/1
CAPSULE ORAL
COMMUNITY
Start: 2017-08-24

## 2018-04-06 RX ORDER — MELOXICAM 7.5 MG/1
1 TABLET ORAL DAILY PRN
COMMUNITY
Start: 2018-01-15

## 2018-04-06 RX ORDER — ALPRAZOLAM 0.25 MG/1
1 TABLET ORAL EVERY 6 HOURS PRN
COMMUNITY
End: 2018-08-20 | Stop reason: SDUPTHER

## 2018-04-06 RX ORDER — TRAMADOL HYDROCHLORIDE 50 MG/1
50 TABLET ORAL EVERY 8 HOURS
COMMUNITY

## 2018-04-06 RX ORDER — ALPRAZOLAM 0.25 MG/1
0.25 TABLET ORAL DAILY
COMMUNITY
End: 2018-04-06 | Stop reason: SDUPTHER

## 2018-04-10 ENCOUNTER — OFFICE VISIT (OUTPATIENT)
Dept: SURGERY | Facility: CLINIC | Age: 48
End: 2018-04-10
Payer: COMMERCIAL

## 2018-04-10 VITALS
DIASTOLIC BLOOD PRESSURE: 80 MMHG | BODY MASS INDEX: 21.18 KG/M2 | TEMPERATURE: 98.8 F | WEIGHT: 143 LBS | HEIGHT: 69 IN | HEART RATE: 60 BPM | RESPIRATION RATE: 16 BRPM | SYSTOLIC BLOOD PRESSURE: 110 MMHG

## 2018-04-10 DIAGNOSIS — M54.10 RADICULOPATHY, UNSPECIFIED SPINAL REGION: ICD-10-CM

## 2018-04-10 DIAGNOSIS — R10.32 LEFT GROIN PAIN: Primary | ICD-10-CM

## 2018-04-10 PROCEDURE — 99213 OFFICE O/P EST LOW 20 MIN: CPT | Performed by: PHYSICIAN ASSISTANT

## 2018-04-10 NOTE — LETTER
April 10, 2018     Hannah Meredith, [address] [place] [id number]    Patient: Jerson Powers   YOB: 1970   Date of Visit: 4/10/2018       Dear Dr Rusty Cancino: Thank you for referring Jerson Powers to me for evaluation  Below are my notes for this consultation  If you have questions, please do not hesitate to call me  I look forward to following your patient along with you  Sincerely,        Christian Rodrigez PA-C        CC: No Recipients  Myra Gabrielle  4/10/2018  3:12 PM  Sign at close encounter  Assessment/Plan:   Jerson Powers is a 57 y o male who is here for left groin pain   Bilateral open inguinal hernia repairs with mesh September 2017    Continued left groin pain that radiates into thigh, worse with activity    On physical exam no recurrent hernia  Left groin tender to palpitation  Impression:  Radiculopathy to left groin    Plan:  Continue with low-impact activity  Consider physical therapy evaluation and treatment  Trial of gabapentin which was prescribed for chronic pain  Follow-up in 4-6 months  Considered follow up orthopedic evaluation for chronic back pain and hip pain  Preoperative Clearance: None            ______________________________________________________  CC:Edema (Abdominal)    HPI:  Jerson Powers is a 57 y o male who was referred for evaluation of Edema (Abdominal)    Patient is status post bilateral inguinal hernia repair with mesh in September 2017  Patient with continued left groin pain which has not changed from before surgery  Pain is worse with activity  Pain radiates into inner thigh and down low to knee  Pain is constant  Pain is relieved by rest and tramadol  Currently left groin pain         ROS:  General ROS: negative  negative for - chills, fatigue, fever or night sweats, weight loss  Respiratory ROS: no cough, shortness of breath, or wheezing  Cardiovascular ROS: no chest pain or dyspnea on exertion  Genito-Urinary ROS: no dysuria, trouble voiding, or hematuria  Musculoskeletal ROS: negative for - gait disturbance, joint pain or muscle pain  Neurological ROS: no TIA or stroke symptoms  abdominal pain and see HPI  Skin ROS: no new rashes or lesions   Lymphatic ROS: no new adenopathy noted by pt  GYN ROS: see HPI, no new GYN history or bleeding noted  Psy ROS: no new mental or behavioral disturbances       There is no problem list on file for this patient  Allergies:  Patient has no known allergies  Current Outpatient Prescriptions:     ALPRAZolam (XANAX) 0 25 mg tablet, Take 0 125 mg by mouth daily at bedtime as needed for sleep, Disp: , Rfl:     ALPRAZolam (XANAX) 0 25 mg tablet, Take 1 tablet by mouth every 6 (six) hours as needed, Disp: , Rfl:     gabapentin (NEURONTIN) 100 mg capsule, Take 100 mg by mouth daily as needed Unsure of dose, Disp: , Rfl:     gabapentin (NEURONTIN) 300 mg capsule, Take by mouth, Disp: , Rfl:     HYDROcodone-acetaminophen (NORCO) 5-325 mg per tablet, Take 1 tablet by mouth every 4 (four) hours as needed for pain for up to 30 doses Earliest Fill Date: 9/6/17 Max Daily Amount: 6 tablets, Disp: 30 tablet, Rfl: 0    meloxicam (MOBIC) 7 5 mg tablet, Take 1 tablet by mouth daily as needed, Disp: , Rfl:     traMADol (ULTRAM) 50 mg tablet, Take 50 mg by mouth every 8 (eight) hours, Disp: , Rfl:     Past Medical History:   Diagnosis Date    Sciatica        Past Surgical History:   Procedure Laterality Date    [place] REPAIR ING HERNIA,5+Y/O,REDUCIBL Bilateral 9/6/2017    Procedure: REPAIR HERNIA INGUINAL BILATERAL  WITH MESH;  Surgeon: Esperanza Ulloa MD;  Location: [facility] OR;  Service: General       History reviewed  No pertinent family history  reports that he has quit smoking  He has never used smokeless tobacco  He reports that he drinks alcohol  He reports that he does not use drugs      Labs:   Lab Results   Component Value Date    WBC 7 20 12/28/2017 HGB 15 7 12/28/2017    HCT 46 8 12/28/2017    MCV 88 12/28/2017     12/28/2017     Lab Results   Component Value Date     12/28/2017    K 4 7 12/28/2017     12/28/2017    CO2 31 12/28/2017    ANIONGAP 3 (L) 12/28/2017    BUN 14 12/28/2017    CREATININE 1 23 12/28/2017    GLUF 91 12/28/2017    CALCIUM 9 3 12/28/2017    AST 23 12/28/2017    ALT 37 12/28/2017    ALKPHOS 55 12/28/2017    PROT 7 2 12/28/2017    BILITOT 0 90 12/28/2017    EGFR 70 12/28/2017         Imaging: No new pertinent imaging studies  PHYSICAL EXAM  General Appearance:    Alert, cooperative, no distress   Head:    Normocephalic without obvious abnormality   Eyes:    PERRL, conjunctiva/corneas clear, EOM's intact        Neck:   Supple, no adenopathy, no JVD   Back:     Symmetric, no spinal or CVA tenderness   Lungs:     Clear to auscultation bilaterally, no wheezing or rhonchi   Heart:    Regular rate and rhythm, S1 and S2 normal, no murmur   Abdomen:    soft NTND, +BS, tender suprpubic and left groin, no hernia noted  Healing scars b/l    Extremities:   Extremities normal  No clubbing, cyanosis or edema   Psych:   Normal Affect, AOx3  Neurologic:  Skin:   CNII-XII intact  Strength symmetric, speech intact    Warm, dry, intact, no visible rashes or lesions                       Some portions of this record may have been generated with voice recognition software  There may be translation, syntax,  or grammatical errors  Occasional wrong word or "sound-a-like" substitutions may have occurred due to the inherent limitations of the voice recognition software  Read the chart carefully and recognize, using context, where substitutions may have occurred  If you have any questions, please contact the dictating provider for clarification or correction, as needed  This encounter has been coded by a non-certified coder         Renata Mays PA-C    Date: 4/10/2018 Time: 3:06 PM

## 2018-04-10 NOTE — PROGRESS NOTES
Assessment/Plan:   Eder Maher is a 57 y o male who is here for left groin pain   Bilateral open inguinal hernia repairs with mesh September 2017    Continued left groin pain that radiates into thigh, worse with activity    On physical exam no recurrent hernia  Left groin tender to palpitation  Impression:  Radiculopathy to left groin    Plan:  Continue with low-impact activity  Consider physical therapy evaluation and treatment  Trial of gabapentin which was prescribed for chronic pain  Follow-up in 4-6 months  Considered follow up orthopedic evaluation for chronic back pain and hip pain  Preoperative Clearance: None            ______________________________________________________  CC:Edema (Abdominal)    HPI:  Eder Maher is a 57 y o male who was referred for evaluation of Edema (Abdominal)    Patient is status post bilateral inguinal hernia repair with mesh in September 2017  Patient with continued left groin pain which has not changed from before surgery  Pain is worse with activity  Pain radiates into inner thigh and down low to knee  Pain is constant  Pain is relieved by rest and tramadol  Currently left groin pain  ROS:  General ROS: negative  negative for - chills, fatigue, fever or night sweats, weight loss  Respiratory ROS: no cough, shortness of breath, or wheezing  Cardiovascular ROS: no chest pain or dyspnea on exertion  Genito-Urinary ROS: no dysuria, trouble voiding, or hematuria  Musculoskeletal ROS: negative for - gait disturbance, joint pain or muscle pain  Neurological ROS: no TIA or stroke symptoms  abdominal pain and see HPI  Skin ROS: no new rashes or lesions   Lymphatic ROS: no new adenopathy noted by pt  GYN ROS: see HPI, no new GYN history or bleeding noted  Psy ROS: no new mental or behavioral disturbances       There is no problem list on file for this patient  Allergies:  Patient has no known allergies        Current Outpatient Prescriptions:   ALPRAZolam (XANAX) 0 25 mg tablet, Take 0 125 mg by mouth daily at bedtime as needed for sleep, Disp: , Rfl:     ALPRAZolam (XANAX) 0 25 mg tablet, Take 1 tablet by mouth every 6 (six) hours as needed, Disp: , Rfl:     gabapentin (NEURONTIN) 100 mg capsule, Take 100 mg by mouth daily as needed Unsure of dose, Disp: , Rfl:     gabapentin (NEURONTIN) 300 mg capsule, Take by mouth, Disp: , Rfl:     HYDROcodone-acetaminophen (NORCO) 5-325 mg per tablet, Take 1 tablet by mouth every 4 (four) hours as needed for pain for up to 30 doses Earliest Fill Date: 9/6/17 Max Daily Amount: 6 tablets, Disp: 30 tablet, Rfl: 0    meloxicam (MOBIC) 7 5 mg tablet, Take 1 tablet by mouth daily as needed, Disp: , Rfl:     traMADol (ULTRAM) 50 mg tablet, Take 50 mg by mouth every 8 (eight) hours, Disp: , Rfl:     Past Medical History:   Diagnosis Date    Sciatica        Past Surgical History:   Procedure Laterality Date    [place] REPAIR ING HERNIA,5+Y/O,REDUCIBL Bilateral 9/6/2017    Procedure: REPAIR HERNIA INGUINAL BILATERAL  WITH MESH;  Surgeon: Rosangela Gallegos MD;  Location: [facility] OR;  Service: General       History reviewed  No pertinent family history  reports that he has quit smoking  He has never used smokeless tobacco  He reports that he drinks alcohol  He reports that he does not use drugs  Labs:   Lab Results   Component Value Date    WBC 7 20 12/28/2017    HGB 15 7 12/28/2017    HCT 46 8 12/28/2017    MCV 88 12/28/2017     12/28/2017     Lab Results   Component Value Date     12/28/2017    K 4 7 12/28/2017     12/28/2017    CO2 31 12/28/2017    ANIONGAP 3 (L) 12/28/2017    BUN 14 12/28/2017    CREATININE 1 23 12/28/2017    GLUF 91 12/28/2017    CALCIUM 9 3 12/28/2017    AST 23 12/28/2017    ALT 37 12/28/2017    ALKPHOS 55 12/28/2017    PROT 7 2 12/28/2017    BILITOT 0 90 12/28/2017    EGFR 70 12/28/2017         Imaging: No new pertinent imaging studies           PHYSICAL EXAM  General Appearance:    Alert, cooperative, no distress   Head:    Normocephalic without obvious abnormality   Eyes:    PERRL, conjunctiva/corneas clear, EOM's intact        Neck:   Supple, no adenopathy, no JVD   Back:     Symmetric, no spinal or CVA tenderness   Lungs:     Clear to auscultation bilaterally, no wheezing or rhonchi   Heart:    Regular rate and rhythm, S1 and S2 normal, no murmur   Abdomen:    soft NTND, +BS, tender suprpubic and left groin, no hernia noted  Healing scars b/l    Extremities:   Extremities normal  No clubbing, cyanosis or edema   Psych:   Normal Affect, AOx3  Neurologic:  Skin:   CNII-XII intact  Strength symmetric, speech intact    Warm, dry, intact, no visible rashes or lesions                       Some portions of this record may have been generated with voice recognition software  There may be translation, syntax,  or grammatical errors  Occasional wrong word or "sound-a-like" substitutions may have occurred due to the inherent limitations of the voice recognition software  Read the chart carefully and recognize, using context, where substitutions may have occurred  If you have any questions, please contact the dictating provider for clarification or correction, as needed  This encounter has been coded by a non-certified coder         Ruby Cuevas PA-C    Date: 4/10/2018 Time: 3:06 PM

## 2018-08-06 ENCOUNTER — OFFICE VISIT (OUTPATIENT)
Dept: SURGERY | Facility: CLINIC | Age: 48
End: 2018-08-06
Payer: COMMERCIAL

## 2018-08-06 VITALS
HEIGHT: 69 IN | SYSTOLIC BLOOD PRESSURE: 116 MMHG | WEIGHT: 139 LBS | DIASTOLIC BLOOD PRESSURE: 68 MMHG | BODY MASS INDEX: 20.59 KG/M2 | HEART RATE: 60 BPM

## 2018-08-06 DIAGNOSIS — G89.29 CHRONIC LEFT-SIDED LOW BACK PAIN WITHOUT SCIATICA: Primary | ICD-10-CM

## 2018-08-06 DIAGNOSIS — M54.50 CHRONIC LEFT-SIDED LOW BACK PAIN WITHOUT SCIATICA: Primary | ICD-10-CM

## 2018-08-06 PROCEDURE — 99212 OFFICE O/P EST SF 10 MIN: CPT | Performed by: SURGERY

## 2018-08-06 NOTE — PROGRESS NOTES
Assessment/Plan:   Kamron Nicole is a 57 y o male who is here for The encounter diagnosis was Chronic left-sided low back pain without sciatica  Concern for lipoma of back compressing on his nerve    Patient feels he has paraspinal mass that can be contributing to his chronic back pain and pain that radiates around groin  Patient is frustrated that this pain is affecting his activity of daily living  Patient had a left inguinal hernia repair and continues to have pain and swelling in that area    Plan:  Unable to palpate mass or lipoma  Feel that this is too deep and to close to spinal cord that it would not be something that General surgery would explore that area for  Recommended evaluation by pain specialist for possible epidural or facet injections  Offered patient CT scan of lumbar region of back and of groin to look for any masses or fluid collections  ______________________________________________________  CC:No chief complaint on file  Naman Parmar HPI:  Kamron Nicole is a 57 y o male who was referred for evaluation of No chief complaint on file       Currently low back pain and associated mass concern for lipoma    Patient with chronic back pain in which she has been evaluated by multiple physicians and gone through physical therapy  Patient read an article about a lipoma of the back and feels that he has 1 that is compressing on his nerves causing him pain  Patient also continues with left groin pain since left inguinal hernia repair   Patient states that his pain affects his activity of daily living, he is unable to exercise  Pain can be debilitating at times  Denies weakness, numbness tingling in legs       ROS:  General ROS: negative  negative for - chills, fatigue, fever or night sweats, weight loss  Respiratory ROS: no cough, shortness of breath, or wheezing  Cardiovascular ROS: no chest pain or dyspnea on exertion  Genito-Urinary ROS: no dysuria, trouble voiding, or hematuria  Musculoskeletal ROS: negative for - gait disturbance, joint pain or muscle pain  Neurological ROS: no TIA or stroke symptoms    {back pain and left groin pain and see HPI    Skin ROS: no new rashes or lesions   Lymphatic ROS: no new adenopathy noted by pt  GYN ROS: see HPI, no new GYN history or bleeding noted  Psy ROS: no new mental or behavioral disturbances       Patient Active Problem List   Diagnosis    Left groin pain    Chronic left-sided low back pain         Allergies:  Patient has no known allergies  Current Outpatient Prescriptions:     ALPRAZolam (XANAX) 0 25 mg tablet, Take 0 125 mg by mouth daily at bedtime as needed for sleep, Disp: , Rfl:     HYDROcodone-acetaminophen (NORCO) 5-325 mg per tablet, Take 1 tablet by mouth every 4 (four) hours as needed for pain for up to 30 doses Earliest Fill Date: 9/6/17 Max Daily Amount: 6 tablets, Disp: 30 tablet, Rfl: 0    traMADol (ULTRAM) 50 mg tablet, Take 50 mg by mouth every 8 (eight) hours, Disp: , Rfl:     ALPRAZolam (XANAX) 0 25 mg tablet, Take 1 tablet by mouth every 6 (six) hours as needed, Disp: , Rfl:     gabapentin (NEURONTIN) 100 mg capsule, Take 100 mg by mouth daily as needed Unsure of dose, Disp: , Rfl:     gabapentin (NEURONTIN) 300 mg capsule, Take by mouth, Disp: , Rfl:     meloxicam (MOBIC) 7 5 mg tablet, Take 1 tablet by mouth daily as needed, Disp: , Rfl:     Past Medical History:   Diagnosis Date    Sciatica        Past Surgical History:   Procedure Laterality Date    [place] REPAIR ING HERNIA,5+Y/O,REDUCIBL Bilateral 9/6/2017    Procedure: REPAIR HERNIA INGUINAL BILATERAL  WITH MESH;  Surgeon: Mohan Clayton MD;  Location: [facility];  Service: General       No family history on file  reports that he has quit smoking  He has never used smokeless tobacco  He reports that he drinks alcohol  He reports that he does not use drugs      Labs:   Lab Results   Component Value Date    WBC 7 20 12/28/2017    HGB 15 7 12/28/2017    HCT 46 8 12/28/2017    MCV 88 12/28/2017     12/28/2017     Lab Results   Component Value Date     12/28/2017    K 4 7 12/28/2017     12/28/2017    CO2 31 12/28/2017    ANIONGAP 3 (L) 12/28/2017    BUN 14 12/28/2017    CREATININE 1 23 12/28/2017    GLUF 91 12/28/2017    CALCIUM 9 3 12/28/2017    AST 23 12/28/2017    ALT 37 12/28/2017    ALKPHOS 55 12/28/2017    PROT 7 2 12/28/2017    BILITOT 0 90 12/28/2017    EGFR 70 12/28/2017         Imaging: No new pertinent imaging studies  PHYSICAL EXAM    /68 (BP Location: Right arm, Patient Position: Sitting, Cuff Size: Adult)   Pulse 60   Ht 5' 9" (1 753 m)   Wt 63 kg (139 lb)   BMI 20 53 kg/m²   General appearance: alert and oriented, in no acute distress  Back: no skin lesions, erythema, or scars, symmetric, no curvature  ROM normal  No CVA tenderness  Extremities: extremities normal, warm and well-perfused; no cyanosis, clubbing, or edema  Skin: Skin color, texture, turgor normal  No rashes or lesions            Some portions of this record may have been generated with voice recognition software  There may be translation, syntax,  or grammatical errors  Occasional wrong word or "sound-a-like" substitutions may have occurred due to the inherent limitations of the voice recognition software  Read the chart carefully and recognize, using context, where substitutions may have occurred  If you have any questions, please contact the dictating provider for clarification or correction, as needed  This encounter has been coded by a non-certified coder         Clarisa Calix MD    Date: 8/6/2018 Time: 3:43 PM

## 2018-08-06 NOTE — LETTER
August 6, 2018     [address] [place] [address]    Patient: Javy Corcoran   YOB: 1970   Date of Visit: 8/6/2018       Dear Dr Masterson July: Thank you for referring Javy Corcoran to me for evaluation  Below are my notes for this consultation  If you have questions, please do not hesitate to call me  I look forward to following your patient along with you  Sincerely,        Corina Eisenmenger, MD        CC: No Recipients  Valdene Pass  8/6/2018  3:49 PM  Sign at close encounter  Assessment/Plan:   Javy Corcoran is a 57 y o male who is here for The encounter diagnosis was Chronic left-sided low back pain without sciatica  Concern for lipoma of back compressing on his nerve    Patient feels he has paraspinal mass that can be contributing to his chronic back pain and pain that radiates around groin  Patient is frustrated that this pain is affecting his activity of daily living  Patient had a left inguinal hernia repair and continues to have pain and swelling in that area    Plan:  Unable to palpate mass or lipoma  Feel that this is too deep and to close to spinal cord that it would not be something that General surgery would explore that area for  Recommended evaluation by pain specialist for possible epidural or facet injections  Offered patient CT scan of lumbar region of back and of groin to look for any masses or fluid collections  ______________________________________________________  CC:No chief complaint on file  Nadiya Schmidt HPI:  Javy Corcoran is a 57 y o male who was referred for evaluation of No chief complaint on file       Currently low back pain and associated mass concern for lipoma    Patient with chronic back pain in which she has been evaluated by multiple physicians and gone through physical therapy  Patient read an article about a lipoma of the back and feels that he has 1 that is compressing on his nerves causing him pain  Patient also continues with left groin pain since left inguinal hernia repair   Patient states that his pain affects his activity of daily living, he is unable to exercise  Pain can be debilitating at times  Denies weakness, numbness tingling in legs  ROS:  General ROS: negative  negative for - chills, fatigue, fever or night sweats, weight loss  Respiratory ROS: no cough, shortness of breath, or wheezing  Cardiovascular ROS: no chest pain or dyspnea on exertion  Genito-Urinary ROS: no dysuria, trouble voiding, or hematuria  Musculoskeletal ROS: negative for - gait disturbance, joint pain or muscle pain  Neurological ROS: no TIA or stroke symptoms    { back pain and left groin pain and see HPI    Skin ROS: no new rashes or lesions   Lymphatic ROS: no new adenopathy noted by pt  GYN ROS: see HPI, no new GYN history or bleeding noted  Psy ROS: no new mental or behavioral disturbances       Patient Active Problem List   Diagnosis    Left groin pain    Chronic left-sided low back pain         Allergies:  Patient has no known allergies        Current Outpatient Prescriptions:     ALPRAZolam (XANAX) 0 25 mg tablet, Take 0 125 mg by mouth daily at bedtime as needed for sleep, Disp: , Rfl:     HYDROcodone-acetaminophen (NORCO) 5-325 mg per tablet, Take 1 tablet by mouth every 4 (four) hours as needed for pain for up to 30 doses Earliest Fill Date: 9/6/17 Max Daily Amount: 6 tablets, Disp: 30 tablet, Rfl: 0    traMADol (ULTRAM) 50 mg tablet, Take 50 mg by mouth every 8 (eight) hours, Disp: , Rfl:     ALPRAZolam (XANAX) 0 25 mg tablet, Take 1 tablet by mouth every 6 (six) hours as needed, Disp: , Rfl:     gabapentin (NEURONTIN) 100 mg capsule, Take 100 mg by mouth daily as needed Unsure of dose, Disp: , Rfl:     gabapentin (NEURONTIN) 300 mg capsule, Take by mouth, Disp: , Rfl:     meloxicam (MOBIC) 7 5 mg tablet, Take 1 tablet by mouth daily as needed, Disp: , Rfl:     Past Medical History:   Diagnosis Date  Sciatica        Past Surgical History:   Procedure Laterality Date    [place] REPAIR ING HERNIA,5+Y/O,REDUCIBL Bilateral 9/6/2017    Procedure: REPAIR HERNIA INGUINAL BILATERAL  WITH MESH;  Surgeon: Yahaira Barrera MD;  Location: [facility];  Service: General       No family history on file  reports that he has quit smoking  He has never used smokeless tobacco  He reports that he drinks alcohol  He reports that he does not use drugs  Labs:   Lab Results   Component Value Date    WBC 7 20 12/28/2017    HGB 15 7 12/28/2017    HCT 46 8 12/28/2017    MCV 88 12/28/2017     12/28/2017     Lab Results   Component Value Date     12/28/2017    K 4 7 12/28/2017     12/28/2017    CO2 31 12/28/2017    ANIONGAP 3 (L) 12/28/2017    BUN 14 12/28/2017    CREATININE 1 23 12/28/2017    GLUF 91 12/28/2017    CALCIUM 9 3 12/28/2017    AST 23 12/28/2017    ALT 37 12/28/2017    ALKPHOS 55 12/28/2017    PROT 7 2 12/28/2017    BILITOT 0 90 12/28/2017    EGFR 70 12/28/2017         Imaging: No new pertinent imaging studies  PHYSICAL EXAM    /68 (BP Location: Right arm, Patient Position: Sitting, Cuff Size: Adult)   Pulse 60   Ht 5' 9" (1 753 m)   Wt 63 kg (139 lb)   BMI 20 53 kg/m²    General appearance: alert and oriented, in no acute distress  Back: no skin lesions, erythema, or scars, symmetric, no curvature  ROM normal  No CVA tenderness  Extremities: extremities normal, warm and well-perfused; no cyanosis, clubbing, or edema  Skin: Skin color, texture, turgor normal  No rashes or lesions            Some portions of this record may have been generated with voice recognition software  There may be translation, syntax,  or grammatical errors  Occasional wrong word or "sound-a-like" substitutions may have occurred due to the inherent limitations of the voice recognition software  Read the chart carefully and recognize, using context, where substitutions may have occurred   If you have any questions, please contact the dictating provider for clarification or correction, as needed  This encounter has been coded by a non-certified coder         Horace Palomino MD    Date: 8/6/2018 Time: 3:43 PM

## 2018-08-07 ENCOUNTER — TELEPHONE (OUTPATIENT)
Dept: PAIN MEDICINE | Facility: MEDICAL CENTER | Age: 48
End: 2018-08-07

## 2018-08-07 NOTE — TELEPHONE ENCOUNTER
Pt called as a self referral to [address]  States that his surgeon also suggested he f/u with pain management  Pt would like to go to the [facility] office if possible  I gave patient our tax id number to call and verify that we are par with his insurance plan, [facility]  Pt will call back

## 2018-08-07 NOTE — TELEPHONE ENCOUNTER
Patient called states he was able to verify that his ins [employer] is within our network for service  Patient states he was told to use: ref [email or web address] for confirmation  After reading prior notes patient was informed that I could not schedule his appointment and he would receive a call back  He was apologetic for yelling however states he is in pain   States he was not sure if he actually saw a prior pain specialist could have been an orthopedic

## 2018-08-07 NOTE — TELEPHONE ENCOUNTER
Patient called back stating he has seen prior pain management but he doesn't remember where  When told he needed his record he started screaming that we can not refuse to see him because he does not have the record  Patient continued to yell that he will not tolerate this and we have to see him  I tried to explain to the patient we need the records and he started screaming even more stating he "will come over and make them schedule"  Patient was told multiple times he needed to stop yelling or I would disconnect the call  Patient continued to scream and I disconnected the call

## 2018-08-09 ENCOUNTER — TELEPHONE (OUTPATIENT)
Dept: SURGERY | Facility: CLINIC | Age: 48
End: 2018-08-09

## 2018-08-09 NOTE — TELEPHONE ENCOUNTER
Patient returned my call I asked him what we could do to meet his expectations regarding his disappointment with the care he received  He would like me to discuss his continued Rt groin pain with Dr Elkin Payan and call him back  He felt brushed off with his pain concerns in his follow up care and expected to see the doctor and not the PA

## 2018-08-09 NOTE — TELEPHONE ENCOUNTER
I attempted to call patient in regards to his being unhappy with the outcome of his office visit with continued pain

## 2018-08-14 ENCOUNTER — TELEPHONE (OUTPATIENT)
Dept: PAIN MEDICINE | Facility: CLINIC | Age: 48
End: 2018-08-14

## 2018-08-14 NOTE — TELEPHONE ENCOUNTER
Spoke with patient today since he requested a call back from a supervisor due to him scheduling an appointment  Patient stated he is self referred, but his surgeon did mention to him that he should go see pain management  Patient stated that he isn't sure if he saw Ortho or Pain management in the past  He said that he has been having low back pain for awhile and nothing has been officially diagnosed as to the cause of it  He saw online that there are injections that are available and was interested in that treatment  Advised patient that I would have his chart reviewed and we will get back to him about scheduling an appt and patient was understanding  Please advise if it is okay to schedule patient for a consult being that he is self referred and not sure if it was Ortho or pain management in the past that he saw   Patient call back # [phone]

## 2018-08-14 NOTE — TELEPHONE ENCOUNTER
I called patient after speaking with Dr Reddy Late to have pt come in for his continued pain and mass or lipoma near his spine  He agreed to come back in and we will waive his co-pay due to his unsatisfactory comments  I scheduled him to come in 8/20/18 @ 10:00 to see only the Physician and will keep him informed on running on time

## 2018-08-14 NOTE — TELEPHONE ENCOUNTER
It looks like he has seen physiatry at [address] [place] [address] a while ago  I would recommend he start with the [facility] with an evaluation  After being evaluated they can either send him to myself or to orthopedics depending on the outcome of the evaluation

## 2018-08-20 ENCOUNTER — DOCUMENTATION (OUTPATIENT)
Dept: PHYSICAL THERAPY | Facility: OTHER | Age: 48
End: 2018-08-20

## 2018-08-20 ENCOUNTER — OFFICE VISIT (OUTPATIENT)
Dept: SURGERY | Facility: CLINIC | Age: 48
End: 2018-08-20

## 2018-08-20 ENCOUNTER — TELEPHONE (OUTPATIENT)
Dept: PHYSICAL THERAPY | Facility: OTHER | Age: 48
End: 2018-08-20

## 2018-08-20 VITALS
DIASTOLIC BLOOD PRESSURE: 78 MMHG | WEIGHT: 138 LBS | RESPIRATION RATE: 18 BRPM | BODY MASS INDEX: 20.44 KG/M2 | SYSTOLIC BLOOD PRESSURE: 122 MMHG | HEART RATE: 61 BPM | TEMPERATURE: 98 F | HEIGHT: 69 IN

## 2018-08-20 DIAGNOSIS — G62.9 NEUROPATHY: Primary | ICD-10-CM

## 2018-08-20 PROCEDURE — PREOP: Performed by: SURGERY

## 2018-08-20 NOTE — PROGRESS NOTES
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  We had a long discussion  He is quite functional with this but just some numbness and may be occasional burning pain on the inner thigh of the left  The left hernia was a plug and patch was significantly larger than the right  He is annoyed by it and is worried that is causing further damage  At this point there is no indication that there is a recurrent hernia  Actually the wound is well healed in the areas quite flat without any significant swelling  Testicular exam bilaterally is normal  We discussed that the mesh is usually plastered in by the body and needs time to become more supple and flexible with movement  He does not have any restrictions in his activities he just feels soreness there and worries about it  At this point I do not think he would do any major damage to the area by continuing yoga and other exercises  He also had significant groin strain prior to this and this would not be addressed by a hernia repair  We discussed homeopathic methods including heat or ice and massage to loosen up the scarring in that area which will hopefully help with the nerve endings  We also discussed pain management is the 1st course of events for trigger point injections if they deem it appropriate  Surgical intervention to cut the stitches around the mesh is a last resort option  He does not want to resort to surgical intervention  He is interested in pain management but mostly for his back, more than his inguinal neuropathy  We are expediting this referral for him  He seems satisfied with this visit and understands that at this point no surgical intervention is entertained    He was seen pain management and follow up in come back to see us if he has significant pain that interferes with his activities of daily living or no relief with pain management     ___________________________________________________  HPI:  Braxton Philip is a 57 y o male who comes in today for postoperative check after recent  on   Currently doing well with some problems :  See below, no fever or chills,no nausea and no vomiting  Reports numbness in the inner thigh on the left and down near the scrotal area  Occasional burning or tingling  No systemic symptoms  Testicles are normal in urination function is normal     ROS:  General ROS: negative for - chills, fatigue, fever or night sweats, weight loss  Respiratory ROS: no cough, shortness of breath, or wheezing  Cardiovascular ROS: no chest pain or dyspnea on exertion  Genito-Urinary ROS: no dysuria, trouble voiding, or hematuria  Musculoskeletal ROS: negative for - gait disturbance, joint pain or muscle pain  Neurological ROS: no TIA or stroke symptoms    GI ROS: see HPI  Skin ROS: no new rashes or lesions   Lymphatic ROS: no new adenopathy noted by pt  GYN ROS: see HPI, no new GYN history or bleeding noted  Psy ROS: no new mental or behavioral disturbances     Patient Active Problem List   Diagnosis    Left groin pain    Chronic left-sided low back pain         Allergies:   Patient has no known allergies        Current Outpatient Prescriptions:     ALPRAZolam (XANAX) 0 25 mg tablet, Take 0 125 mg by mouth daily at bedtime as needed for sleep, Disp: , Rfl:     traMADol (ULTRAM) 50 mg tablet, Take 50 mg by mouth every 8 (eight) hours, Disp: , Rfl:     gabapentin (NEURONTIN) 100 mg capsule, Take 100 mg by mouth daily as needed Unsure of dose, Disp: , Rfl:     gabapentin (NEURONTIN) 300 mg capsule, Take by mouth, Disp: , Rfl:     HYDROcodone-acetaminophen (NORCO) 5-325 mg per tablet, Take 1 tablet by mouth every 4 (four) hours as needed for pain for up to 30 doses Earliest Fill Date: 9/6/17 Max Daily Amount: 6 tablets (Patient not taking: Reported on 8/20/2018 ), Disp: 30 tablet, Rfl: 0    meloxicam (MOBIC) 7 5 mg tablet, Take 1 tablet by mouth daily as needed, Disp: , Rfl:     Past Medical History:   Diagnosis Date    Sciatica        Past Surgical History:   Procedure Laterality Date    UT REPAIR ING HERNIA,5+Y/O,REDUCIBL Bilateral 9/6/2017    Procedure: REPAIR HERNIA INGUINAL BILATERAL  WITH MESH;  Surgeon: Ann Marie Ballard MD;  Location: [place] [facility] OR;  Service: General       History reviewed  No pertinent family history  reports that he has quit smoking  He has never used smokeless tobacco  He reports that he drinks alcohol  He reports that he does not use drugs  PHYSICAL EXAM  General: normal, cooperative, no distress  Abdominal: soft, nondistended or nontender  Incision: clean, dry, and intact and healing well  Flat area with minimal to no swelling, well-healed wound, cord is palpable over the bone as he is quite thin  He is tender with the cord crosses over the pubic bone  No evidence of recurrent hernia  Some portions of this record may have been generated with voice recognition software  There may be translation, syntax,  or grammatical errors  Occasional wrong word or "sound-a-like" substitutions may have occurred due to the inherent limitations of the voice recognition software  Read the chart carefully and recognize, using context, where substitutions may have occurred  If you have any questions, please contact the dictating provider for clarification or correction, as needed  This encounter has been coded by a non-certified coder         Ann Marie Ballard MD    Date: 8/20/2018 Time: 10:42 AM

## 2018-08-20 NOTE — LETTER
August 20, 2018     Brandon Christy, [address] [place] [id number]    Patient: Karson Ewing   YOB: 1970   Date of Visit: 8/20/2018       Dear Dr Luca Jessica: Thank you for referring Karson Ewing to me for evaluation  Below are my notes for this consultation  If you have questions, please do not hesitate to call me  I look forward to following your patient along with you  Sincerely,        Nathen Sacks, MD        CC: No Recipients  Nathen Sacks, MD  8/20/2018 10:47 AM  Sign at close encounter  Assessment/Plan:   Karson Ewing is a 57 y o male who comes in today for postoperative check after  on   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  We had a long discussion  He is quite functional with this but just some numbness and may be occasional burning pain on the inner thigh of the left  The left hernia was a plug and patch was significantly larger than the right  He is annoyed by it and is worried that is causing further damage  At this point there is no indication that there is a recurrent hernia  Actually the wound is well healed in the areas quite flat without any significant swelling  Testicular exam bilaterally is normal  We discussed that the mesh is usually plastered in by the body and needs time to become more supple and flexible with movement  He does not have any restrictions in his activities he just feels soreness there and worries about it  At this point I do not think he would do any major damage to the area by continuing yoga and other exercises  He also had significant groin strain prior to this and this would not be addressed by a hernia repair  We discussed homeopathic methods including heat or ice and massage to loosen up the scarring in that area which will hopefully help with the nerve endings    We also discussed pain management is the 1st course of events for trigger point injections if they deem it appropriate  Surgical intervention to cut the stitches around the mesh is a last resort option  He does not want to resort to surgical intervention  He is interested in pain management but mostly for his back, more than his inguinal neuropathy  We are expediting this referral for him  He seems satisfied with this visit and understands that at this point no surgical intervention is entertained  He was seen pain management and follow up in come back to see us if he has significant pain that interferes with his activities of daily living or no relief with pain management     ___________________________________________________  HPI:  Alyse Burger is a 57 y o male who comes in today for postoperative check after recent  on   Currently doing well with some problems :  See below, no fever or chills,no nausea and no vomiting  Reports numbness in the inner thigh on the left and down near the scrotal area  Occasional burning or tingling  No systemic symptoms  Testicles are normal in urination function is normal     ROS:  General ROS: negative for - chills, fatigue, fever or night sweats, weight loss  Respiratory ROS: no cough, shortness of breath, or wheezing  Cardiovascular ROS: no chest pain or dyspnea on exertion  Genito-Urinary ROS: no dysuria, trouble voiding, or hematuria  Musculoskeletal ROS: negative for - gait disturbance, joint pain or muscle pain  Neurological ROS: no TIA or stroke symptoms    GI ROS: see HPI  Skin ROS: no new rashes or lesions   Lymphatic ROS: no new adenopathy noted by pt  GYN ROS: see HPI, no new GYN history or bleeding noted  Psy ROS: no new mental or behavioral disturbances     Patient Active Problem List   Diagnosis    Left groin pain    Chronic left-sided low back pain         Allergies:   Patient has no known allergies        Current Outpatient Prescriptions:     ALPRAZolam (XANAX) 0 25 mg tablet, Take 0 125 mg by mouth daily at bedtime as needed for sleep, Disp: , Rfl:     traMADol (ULTRAM) 50 mg tablet, Take 50 mg by mouth every 8 (eight) hours, Disp: , Rfl:     gabapentin (NEURONTIN) 100 mg capsule, Take 100 mg by mouth daily as needed Unsure of dose, Disp: , Rfl:     gabapentin (NEURONTIN) 300 mg capsule, Take by mouth, Disp: , Rfl:     HYDROcodone-acetaminophen (NORCO) 5-325 mg per tablet, Take 1 tablet by mouth every 4 (four) hours as needed for pain for up to 30 doses Earliest Fill Date: 9/6/17 Max Daily Amount: 6 tablets (Patient not taking: Reported on 8/20/2018 ), Disp: 30 tablet, Rfl: 0    meloxicam (MOBIC) 7 5 mg tablet, Take 1 tablet by mouth daily as needed, Disp: , Rfl:     Past Medical History:   Diagnosis Date    Sciatica        Past Surgical History:   Procedure Laterality Date    RI REPAIR ING HERNIA,5+Y/O,REDUCIBL Bilateral 9/6/2017    Procedure: REPAIR HERNIA INGUINAL BILATERAL  WITH MESH;  Surgeon: Wade Rivas MD;  Location: [facility];  Service: General       History reviewed  No pertinent family history  reports that he has quit smoking  He has never used smokeless tobacco  He reports that he drinks alcohol  He reports that he does not use drugs  PHYSICAL EXAM  General: normal, cooperative, no distress  Abdominal: soft, nondistended or nontender  Incision: clean, dry, and intact and healing well  Flat area with minimal to no swelling, well-healed wound, cord is palpable over the bone as he is quite thin  He is tender with the cord crosses over the pubic bone  No evidence of recurrent hernia  Some portions of this record may have been generated with voice recognition software  There may be translation, syntax,  or grammatical errors  Occasional wrong word or "sound-a-like" substitutions may have occurred due to the inherent limitations of the voice recognition software  Read the chart carefully and recognize, using context, where substitutions may have occurred   If you have any questions, please contact the dictating provider for clarification or correction, as needed  This encounter has been coded by a non-certified coder         Paris Roth MD    Date: 8/20/2018 Time: 10:42 AM

## 2018-08-20 NOTE — TELEPHONE ENCOUNTER
Dr Abby Steinberg office and patient called inquiring on status of referral  As per Dr Dago Ghosh note, provided pt w/[facility] #   Patient was annoyed, stated he did not want to see an orthopedic doctor

## 2018-08-20 NOTE — TELEPHONE ENCOUNTER
Attempted to reach patient, [facility] with c/b #, [place] provided  *Please schedule consult with [facility]  Thanks

## 2018-08-20 NOTE — PROGRESS NOTES
Call from [facility] today seeking clarification on said pt  Explained that pt is already being managed and seeking pain management asper the referral from Dr Brent Posey  Explained that if [facility] were to triage him, being that he is chronic, we would put a referral in to [facility] and Pain Management to contact him

## 2018-08-20 NOTE — TELEPHONE ENCOUNTER
Please see below, looks like Dr Cony Avilez, general sx, is referring him to [facility]   Do you still want pt to proceed with [facility] evaluation first?

## 2018-08-20 NOTE — TELEPHONE ENCOUNTER
Left follow up voicemail for patient providing phone number for spine and pain to schedule with Dr Lorelei Holt [phone]  Provided with CS number if he needs any further help as well  Spine and pain call center aware

## 2018-08-20 NOTE — PROGRESS NOTES
Patient is not a candidate at this time to be re telephone triaged through [facility]  Patient has seen spine specialist PT, has seen Dr Priscilla Simeon and has seen physiatry  Patient has also had imaging  Patient should be scheduled with Dr Taniya Arceo

## 2018-08-20 NOTE — TELEPHONE ENCOUNTER
AS, if we have the referral from Dr Socrates Steven can I schedule the patient for consult with you? I s/w Jake Hamilton at [facility] and she said they would just be calling the patient and getting a referral for the patient, which we already have  Please advise  Thanks

## 2018-08-28 ENCOUNTER — CONSULT (OUTPATIENT)
Dept: PAIN MEDICINE | Facility: CLINIC | Age: 48
End: 2018-08-28
Payer: COMMERCIAL

## 2018-08-28 VITALS
HEART RATE: 73 BPM | WEIGHT: 141 LBS | BODY MASS INDEX: 20.88 KG/M2 | HEIGHT: 69 IN | SYSTOLIC BLOOD PRESSURE: 100 MMHG | DIASTOLIC BLOOD PRESSURE: 60 MMHG | RESPIRATION RATE: 18 BRPM

## 2018-08-28 DIAGNOSIS — G89.4 CHRONIC PAIN SYNDROME: ICD-10-CM

## 2018-08-28 DIAGNOSIS — M47.816 LUMBAR SPONDYLOSIS: ICD-10-CM

## 2018-08-28 DIAGNOSIS — M54.50 CHRONIC LEFT-SIDED LOW BACK PAIN WITHOUT SCIATICA: Primary | ICD-10-CM

## 2018-08-28 DIAGNOSIS — G89.29 CHRONIC LEFT-SIDED LOW BACK PAIN WITHOUT SCIATICA: Primary | ICD-10-CM

## 2018-08-28 PROCEDURE — 99244 OFF/OP CNSLTJ NEW/EST MOD 40: CPT | Performed by: ANESTHESIOLOGY

## 2018-08-28 NOTE — LETTER
August 28, 2018     April Melissa MD  [address] [place] [address]    Patient: Vic Philip   YOB: 1970   Date of Visit: 8/28/2018       Dear Dr Damir Tejada: Thank you for referring Vic Philip to me for evaluation  Below are my notes for this consultation  If you have questions, please do not hesitate to call me  I look forward to following your patient along with you  Sincerely,        Sabrina Olson MD        CC: Jacqulin Dakins, MD Kristen Mayans, MD  8/28/2018 10:22 AM  Sign at close encounter  Assessment:  1  Chronic left-sided low back pain without sciatica    2  Chronic pain syndrome    3  Lumbar spondylosis        Plan:  My impressions and treatment recommendations were discussed in detail with the patient, who verbalized understanding and had no further questions  Given that the patient has signs and symptoms consistent with left lumbar facet syndrome, discussed the rationale of undergoing left L3, L4, L5, and S1 diagnostic and confirmatory medial branch blocks  The procedures, its risks, and benefits were explained in detail to the patient  Risks include but are not limited to bleeding, infection, hematoma formation, abscess formation, weakness, headache, failure the pain to improve, nerve irritation or damage, and potential worsening of the pain  The patient verbalized understanding and wished to proceed with the procedure  Follow-up is planned with the patient in 4 weeks time or sooner as warranted  Discharge instructions were provided  I personally saw and examined the patient and I agree with the above discussed plan of care  History of Present Illness:    Vic Philip is a 57 y o  male who presents to [facility] for initial evaluation of the above stated pain complaints  The patient has a past medical and chronic pain history as outlined in the assessment section  He was referred by Dr April Melissa      At today's office visit, the patient's pain score is 7/10 on the verbal numerical pain rating scale  The patient states that his pain is primarily in his left-sided low back with radiation into the left hip  He describes his pain is moderate to severe and nearly constant in nature  He reports that his pain is worse at night  He describes his pain as burning, sharp, dull/aching, and shooting    Patient states that standing decreases pain  Lying down, bending, sitting, walking, exercise, and relaxation increases pain  He reports that physical therapy, exercise, 10s unit treatment, and heat/ice treatment provided moderate pain relief  He also reports that chiropractic manipulation provides moderate pain relief  He is currently using tramadol 25 mg half a tablet daily as needed for pain  He reports that this medicine does give him some pain relief  He is currently employed full-time as an owner of an [employer]  Review of Systems:    Review of Systems   Constitutional: Negative for fever and unexpected weight change  HENT: Negative for trouble swallowing  Eyes: Negative for visual disturbance  Respiratory: Negative for shortness of breath and wheezing  Cardiovascular: Negative for chest pain and palpitations  Gastrointestinal: Negative for constipation, diarrhea, nausea and vomiting  Endocrine: Negative for cold intolerance, heat intolerance and polydipsia  Genitourinary: Positive for difficulty urinating  Negative for frequency  Musculoskeletal: Positive for joint swelling (joint stiffness)  Negative for arthralgias, gait problem and myalgias  Skin: Negative for rash  Neurological: Positive for weakness (muscle weakness)  Negative for dizziness, seizures, syncope and headaches  Hematological: Does not bruise/bleed easily  Psychiatric/Behavioral: Negative for dysphoric mood  All other systems reviewed and are negative          Patient Active Problem List   Diagnosis    Left groin pain    Chronic left-sided low back pain    Chronic pain syndrome    Lumbar spondylosis       Past Medical History:   Diagnosis Date    Sciatica        Past Surgical History:   Procedure Laterality Date    [place] REPAIR ING HERNIA,5+Y/O,REDUCIBL Bilateral 9/6/2017    Procedure: REPAIR HERNIA INGUINAL BILATERAL  WITH MESH;  Surgeon: Quita Crigler, MD;  Location: [facility];  Service: General       History reviewed  No pertinent family history  Social History     Occupational History    Not on file  Social History Main Topics    Smoking status: Former Smoker    Smokeless tobacco: Never Used    Alcohol use Yes      Comment: social    Drug use: No    Sexual activity: Not on file         Current Outpatient Prescriptions:     ALPRAZolam (XANAX) 0 25 mg tablet, Take 0 125 mg by mouth daily at bedtime as needed for sleep, Disp: , Rfl:     traMADol (ULTRAM) 50 mg tablet, Take 50 mg by mouth every 8 (eight) hours, Disp: , Rfl:     gabapentin (NEURONTIN) 100 mg capsule, Take 100 mg by mouth daily as needed Unsure of dose, Disp: , Rfl:     gabapentin (NEURONTIN) 300 mg capsule, Take by mouth, Disp: , Rfl:     HYDROcodone-acetaminophen (NORCO) 5-325 mg per tablet, Take 1 tablet by mouth every 4 (four) hours as needed for pain for up to 30 doses Earliest Fill Date: 9/6/17 Max Daily Amount: 6 tablets (Patient not taking: Reported on 8/20/2018 ), Disp: 30 tablet, Rfl: 0    meloxicam (MOBIC) 7 5 mg tablet, Take 1 tablet by mouth daily as needed, Disp: , Rfl:     No Known Allergies    Physical Exam:    /60 (BP Location: Left arm, Patient Position: Sitting, Cuff Size: Standard)   Pulse 73   Resp 18   Ht 5' 9" (1 753 m)   Wt 64 kg (141 lb)   BMI 20 82 kg/m²      Constitutional: normal, well developed, well nourished, alert, in no distress and non-toxic and no overt pain behavior    Eyes: anicteric  HEENT: grossly intact  Neck: supple, symmetric, trachea midline and no masses   Pulmonary:even and unlabored  Cardiovascular:No edema or pitting edema present  Skin:Normal without rashes or lesions and well hydrated  Psychiatric:Mood and affect appropriate  Neurologic:Cranial Nerves II-XII grossly intact  Musculoskeletal:normal     Lumbar Spine Exam    Appearance:  Normal lordosis  Palpation/Tenderness:  no tenderness or spasm  Sensory:  no sensory deficits noted  Range of Motion:  Flexion:  No limitation  without pain  Extension:  Moderately limited  with pain  Lateral Flexion - Left:  No limitation  with pain  Lateral Flexion - Right:  No limitation  with pain  Rotation - Left:  No limitation  with pain  Rotation - Right:  No limitation  without pain   Lumbar facet loading is positive on the left and negative on the right  Motor Strength:  Left hip flexion:  5/5  Left hip extension:  5/5  Right hip flexion:  5/5  Right hip extension:  5/5  Left knee flexion:  5/5  Left knee extension:  5/5  Right knee flexion:  5/5  Right knee extension:  5/5  Left foot dorsiflexion:  5/5  Left foot plantar flexion:  5/5  Right foot dorsiflexion:  5/5  Right foot plantar flexion:  5/5  Reflexes:  Left Patellar:  2+   Right Patellar:  2+   Left Achilles:  2+   Right Achilles:  2+   Special Tests:  Left Straight Leg Test:  negative  Right Straight Leg Test:  negative  Left Tam's Maneuver:  negative  Right Tam's Maneuver:  negative    Imaging  No orders to display   12/12/17  MRI LUMBAR SPINE WITHOUT CONTRAST     INDICATION:  M54 9: Dorsalgia, unspecified  M54 5: Low back pain  History taken directly from the electronic ordering system      COMPARISON:  None      TECHNIQUE:  Sagittal T1, sagittal T2, sagittal inversion recovery, axial T1 and axial T2, coronal T2        IMAGE QUALITY:  Diagnostic     FINDINGS:     ALIGNMENT:  Minimal levocurvature  No significant listhesis      MARROW SIGNAL:  Marrow signal is within normal limits without signs of an infiltrative process   No signs of acute fracture or significant marrow edema pattern  Vertebral body heights are maintained        DISTAL CORD AND CONUS:  Normal size and signal within the distal cord and conus  The conus ends at the L1 level      PARASPINAL SOFT TISSUES:  Paraspinal soft tissues are unremarkable      SACRUM:  Normal signal within the sacrum  No evidence of insufficiency or stress fracture      LOWER THORACIC DISC SPACES:  Normal disc height and signal   No disc herniation, canal stenosis or foraminal narrowing      LUMBAR DISC SPACES:          L1-L2:  No significant spinal canal stenosis  No right and no left neural foraminal stenosis       L2-L3:  No significant spinal canal stenosis  No right and no left neural foraminal stenosis      L3-L4:  Circumferential disc bulge  No significant spinal canal stenosis  No right and no left neural foraminal stenosis      L4-L5:  Bulging annulus  Bilateral facet arthropathy  No significant spinal canal stenosis  No right and no left neural foraminal stenosis      L5-S1:  Bilateral facet arthropathy  No significant spinal canal stenosis  No right and no left neural foraminal stenosis      IMPRESSION:     Lumbar spondylosis without significant spinal canal foraminal stenosis        Workstation performed: [id number]      Imaging     MRI lumbar spine wo contrast (Order #[id number]) on 12/13/2017 - Imaging Information    Show result history   Result Comparison   MRI lumbar spine wo contrast (Order [id number])   Newer Version Older Version   Final result    12/14/2017  9:21 AM    Interface, Radiology Results In         This is the newest version No older versions exist   Narrative     MRI LUMBAR SPINE WITHOUT CONTRAST     INDICATION:  M54 9: Dorsalgia, unspecified   M54 5: Low back pain  History taken directly from the electronic ordering system  COMPARISON:  None       TECHNIQUE:  Sagittal T1, sagittal T2, sagittal inversion recovery, axial T1 and axial T2, coronal T2        IMAGE QUALITY:  Diagnostic     FINDINGS: ALIGNMENT:  Minimal levocurvature   No significant listhesis  MARROW SIGNAL:  Marrow signal is within normal limits without signs of an infiltrative process  No signs of acute fracture or significant marrow edema pattern  Vertebral body heights are maintained        DISTAL CORD AND CONUS:  Normal size and signal within the distal cord and conus   The conus ends at the L1 level  PARASPINAL SOFT TISSUES:  Paraspinal soft tissues are unremarkable  SACRUM:  Normal signal within the sacrum  No evidence of insufficiency or stress fracture  LOWER THORACIC DISC SPACES:  Normal disc height and signal   No disc herniation, canal stenosis or foraminal narrowing  LUMBAR DISC SPACES:          L1-L2:  No significant spinal canal stenosis  No right and no left neural foraminal stenosis  L2-L3:  No significant spinal canal stenosis   No right and no left neural foraminal stenosis  L3-L4:  Circumferential disc bulge   No significant spinal canal stenosis  No right and no left neural foraminal stenosis  L4-L5:  Bulging annulus   Bilateral facet arthropathy   No significant spinal canal stenosis  No right and no left neural foraminal stenosis  L5-S1:  Bilateral facet arthropathy   No significant spinal canal stenosis  No right and no left neural foraminal stenosis  Impression       Lumbar spondylosis without significant spinal canal foraminal stenosis  No orders of the defined types were placed in this encounter

## 2018-08-28 NOTE — PROGRESS NOTES
Assessment:  1  Chronic left-sided low back pain without sciatica    2  Chronic pain syndrome    3  Lumbar spondylosis        Plan:  My impressions and treatment recommendations were discussed in detail with the patient, who verbalized understanding and had no further questions  Given that the patient has signs and symptoms consistent with left lumbar facet syndrome, discussed the rationale of undergoing left L3, L4, L5, and S1 diagnostic and confirmatory medial branch blocks  The procedures, its risks, and benefits were explained in detail to the patient  Risks include but are not limited to bleeding, infection, hematoma formation, abscess formation, weakness, headache, failure the pain to improve, nerve irritation or damage, and potential worsening of the pain  The patient verbalized understanding and wished to proceed with the procedure  Follow-up is planned with the patient in 4 weeks time or sooner as warranted  Discharge instructions were provided  I personally saw and examined the patient and I agree with the above discussed plan of care  History of Present Illness:    Preet Tracy is a 57 y o  male who presents to [facility] for initial evaluation of the above stated pain complaints  The patient has a past medical and chronic pain history as outlined in the assessment section  He was referred by Dr Kelsie Alejandro  At today's office visit, the patient's pain score is 7/10 on the verbal numerical pain rating scale  The patient states that his pain is primarily in his left-sided low back with radiation into the left hip  He describes his pain is moderate to severe and nearly constant in nature  He reports that his pain is worse at night  He describes his pain as burning, sharp, dull/aching, and shooting    Patient states that standing decreases pain  Lying down, bending, sitting, walking, exercise, and relaxation increases pain   He reports that physical therapy, exercise, 10s unit treatment, and heat/ice treatment provided moderate pain relief  He also reports that chiropractic manipulation provides moderate pain relief  He is currently using tramadol 25 mg half a tablet daily as needed for pain  He reports that this medicine does give him some pain relief  He is currently employed full-time as an owner of an [employer]  Review of Systems:    Review of Systems   Constitutional: Negative for fever and unexpected weight change  HENT: Negative for trouble swallowing  Eyes: Negative for visual disturbance  Respiratory: Negative for shortness of breath and wheezing  Cardiovascular: Negative for chest pain and palpitations  Gastrointestinal: Negative for constipation, diarrhea, nausea and vomiting  Endocrine: Negative for cold intolerance, heat intolerance and polydipsia  Genitourinary: Positive for difficulty urinating  Negative for frequency  Musculoskeletal: Positive for joint swelling (joint stiffness)  Negative for arthralgias, gait problem and myalgias  Skin: Negative for rash  Neurological: Positive for weakness (muscle weakness)  Negative for dizziness, seizures, syncope and headaches  Hematological: Does not bruise/bleed easily  Psychiatric/Behavioral: Negative for dysphoric mood  All other systems reviewed and are negative  Patient Active Problem List   Diagnosis    Left groin pain    Chronic left-sided low back pain    Chronic pain syndrome    Lumbar spondylosis       Past Medical History:   Diagnosis Date    Sciatica        Past Surgical History:   Procedure Laterality Date    [place] REPAIR ING HERNIA,5+Y/O,REDUCIBL Bilateral 9/6/2017    Procedure: REPAIR HERNIA INGUINAL BILATERAL  WITH MESH;  Surgeon: Leila Manzanares MD;  Location: [facility];  Service: General       History reviewed  No pertinent family history  Social History     Occupational History    Not on file       Social History Main Topics    Smoking status: Former Smoker    Smokeless tobacco: Never Used    Alcohol use Yes      Comment: social    Drug use: No    Sexual activity: Not on file         Current Outpatient Prescriptions:     ALPRAZolam (XANAX) 0 25 mg tablet, Take 0 125 mg by mouth daily at bedtime as needed for sleep, Disp: , Rfl:     traMADol (ULTRAM) 50 mg tablet, Take 50 mg by mouth every 8 (eight) hours, Disp: , Rfl:     gabapentin (NEURONTIN) 100 mg capsule, Take 100 mg by mouth daily as needed Unsure of dose, Disp: , Rfl:     gabapentin (NEURONTIN) 300 mg capsule, Take by mouth, Disp: , Rfl:     HYDROcodone-acetaminophen (NORCO) 5-325 mg per tablet, Take 1 tablet by mouth every 4 (four) hours as needed for pain for up to 30 doses Earliest Fill Date: 9/6/17 Max Daily Amount: 6 tablets (Patient not taking: Reported on 8/20/2018 ), Disp: 30 tablet, Rfl: 0    meloxicam (MOBIC) 7 5 mg tablet, Take 1 tablet by mouth daily as needed, Disp: , Rfl:     No Known Allergies    Physical Exam:    /60 (BP Location: Left arm, Patient Position: Sitting, Cuff Size: Standard)   Pulse 73   Resp 18   Ht 5' 9" (1 753 m)   Wt 64 kg (141 lb)   BMI 20 82 kg/m²     Constitutional: normal, well developed, well nourished, alert, in no distress and non-toxic and no overt pain behavior    Eyes: anicteric  HEENT: grossly intact  Neck: supple, symmetric, trachea midline and no masses   Pulmonary:even and unlabored  Cardiovascular:No edema or pitting edema present  Skin:Normal without rashes or lesions and well hydrated  Psychiatric:Mood and affect appropriate  Neurologic:Cranial Nerves II-XII grossly intact  Musculoskeletal:normal     Lumbar Spine Exam    Appearance:  Normal lordosis  Palpation/Tenderness:  no tenderness or spasm  Sensory:  no sensory deficits noted  Range of Motion:  Flexion:  No limitation  without pain  Extension:  Moderately limited  with pain  Lateral Flexion - Left:  No limitation  with pain  Lateral Flexion - Right:  No limitation with pain  Rotation - Left:  No limitation  with pain  Rotation - Right:  No limitation  without pain   Lumbar facet loading is positive on the left and negative on the right  Motor Strength:  Left hip flexion:  5/5  Left hip extension:  5/5  Right hip flexion:  5/5  Right hip extension:  5/5  Left knee flexion:  5/5  Left knee extension:  5/5  Right knee flexion:  5/5  Right knee extension:  5/5  Left foot dorsiflexion:  5/5  Left foot plantar flexion:  5/5  Right foot dorsiflexion:  5/5  Right foot plantar flexion:  5/5  Reflexes:  Left Patellar:  2+   Right Patellar:  2+   Left Achilles:  2+   Right Achilles:  2+   Special Tests:  Left Straight Leg Test:  negative  Right Straight Leg Test:  negative  Left Tam's Maneuver:  negative  Right Tam's Maneuver:  negative    Imaging  No orders to display   12/12/17  MRI LUMBAR SPINE WITHOUT CONTRAST     INDICATION:  M54 9: Dorsalgia, unspecified  M54 5: Low back pain  History taken directly from the electronic ordering system      COMPARISON:  None      TECHNIQUE:  Sagittal T1, sagittal T2, sagittal inversion recovery, axial T1 and axial T2, coronal T2        IMAGE QUALITY:  Diagnostic     FINDINGS:     ALIGNMENT:  Minimal levocurvature  No significant listhesis      MARROW SIGNAL:  Marrow signal is within normal limits without signs of an infiltrative process  No signs of acute fracture or significant marrow edema pattern  Vertebral body heights are maintained        DISTAL CORD AND CONUS:  Normal size and signal within the distal cord and conus  The conus ends at the L1 level      PARASPINAL SOFT TISSUES:  Paraspinal soft tissues are unremarkable      SACRUM:  Normal signal within the sacrum  No evidence of insufficiency or stress fracture      LOWER THORACIC DISC SPACES:  Normal disc height and signal   No disc herniation, canal stenosis or foraminal narrowing      LUMBAR DISC SPACES:          L1-L2:  No significant spinal canal stenosis   No right and no left neural foraminal stenosis       L2-L3:  No significant spinal canal stenosis  No right and no left neural foraminal stenosis      L3-L4:  Circumferential disc bulge  No significant spinal canal stenosis  No right and no left neural foraminal stenosis      L4-L5:  Bulging annulus  Bilateral facet arthropathy  No significant spinal canal stenosis  No right and no left neural foraminal stenosis      L5-S1:  Bilateral facet arthropathy  No significant spinal canal stenosis  No right and no left neural foraminal stenosis      IMPRESSION:     Lumbar spondylosis without significant spinal canal foraminal stenosis        Workstation performed: [id number]      Imaging     MRI lumbar spine wo contrast (Order #[id number]) on 12/13/2017 - Imaging Information    Show result history   Result Comparison   MRI lumbar spine wo contrast (Order [id number])   Newer Version Older Version   Final result    12/14/2017  9:21 AM    Interface, Radiology Results In         This is the newest version No older versions exist   Narrative     MRI LUMBAR SPINE WITHOUT CONTRAST     INDICATION:  M54 9: Dorsalgia, unspecified   M54 5: Low back pain  History taken directly from the electronic ordering system  COMPARISON:  None  TECHNIQUE:  Sagittal T1, sagittal T2, sagittal inversion recovery, axial T1 and axial T2, coronal T2        IMAGE QUALITY:  Diagnostic     FINDINGS:     ALIGNMENT:  Minimal levocurvature   No significant listhesis  MARROW SIGNAL:  Marrow signal is within normal limits without signs of an infiltrative process  No signs of acute fracture or significant marrow edema pattern  Vertebral body heights are maintained        DISTAL CORD AND CONUS:  Normal size and signal within the distal cord and conus   The conus ends at the L1 level  PARASPINAL SOFT TISSUES:  Paraspinal soft tissues are unremarkable  SACRUM:  Normal signal within the sacrum  No evidence of insufficiency or stress fracture       LOWER THORACIC DISC SPACES:  Normal disc height and signal   No disc herniation, canal stenosis or foraminal narrowing  LUMBAR DISC SPACES:          L1-L2:  No significant spinal canal stenosis  No right and no left neural foraminal stenosis  L2-L3:  No significant spinal canal stenosis   No right and no left neural foraminal stenosis  L3-L4:  Circumferential disc bulge   No significant spinal canal stenosis  No right and no left neural foraminal stenosis  L4-L5:  Bulging annulus   Bilateral facet arthropathy   No significant spinal canal stenosis  No right and no left neural foraminal stenosis  L5-S1:  Bilateral facet arthropathy   No significant spinal canal stenosis  No right and no left neural foraminal stenosis  Impression       Lumbar spondylosis without significant spinal canal foraminal stenosis  No orders of the defined types were placed in this encounter

## 2018-09-12 ENCOUNTER — HOSPITAL ENCOUNTER (OUTPATIENT)
Facility: AMBULARY SURGERY CENTER | Age: 48
Setting detail: OUTPATIENT SURGERY
Discharge: HOME | End: 2018-09-12
Attending: ANESTHESIOLOGY | Admitting: ANESTHESIOLOGY
Payer: COMMERCIAL

## 2018-09-12 ENCOUNTER — HOSPITAL ENCOUNTER (OUTPATIENT)
Dept: RADIOLOGY | Facility: HOSPITAL | Age: 48
Setting detail: OUTPATIENT SURGERY
Discharge: HOME | End: 2018-09-12
Payer: COMMERCIAL

## 2018-09-12 VITALS
RESPIRATION RATE: 18 BRPM | OXYGEN SATURATION: 100 % | TEMPERATURE: 97 F | DIASTOLIC BLOOD PRESSURE: 55 MMHG | HEART RATE: 52 BPM | SYSTOLIC BLOOD PRESSURE: 120 MMHG

## 2018-09-12 PROCEDURE — 64494 INJ PARAVERT F JNT L/S 2 LEV: CPT | Performed by: ANESTHESIOLOGY

## 2018-09-12 PROCEDURE — 64495 INJ PARAVERT F JNT L/S 3 LEV: CPT | Performed by: ANESTHESIOLOGY

## 2018-09-12 PROCEDURE — 64493 INJ PARAVERT F JNT L/S 1 LEV: CPT | Performed by: ANESTHESIOLOGY

## 2018-09-12 PROCEDURE — 72020 X-RAY EXAM OF SPINE 1 VIEW: CPT

## 2018-09-12 RX ORDER — LIDOCAINE WITH 8.4% SOD BICARB 0.9%(10ML)
SYRINGE (ML) INJECTION AS NEEDED
Status: DISCONTINUED | OUTPATIENT
Start: 2018-09-12 | End: 2018-09-12 | Stop reason: HOSPADM

## 2018-09-12 RX ORDER — LIDOCAINE HYDROCHLORIDE 10 MG/ML
INJECTION, SOLUTION EPIDURAL; INFILTRATION; INTRACAUDAL; PERINEURAL AS NEEDED
Status: DISCONTINUED | OUTPATIENT
Start: 2018-09-12 | End: 2018-09-12 | Stop reason: HOSPADM

## 2018-09-12 NOTE — DISCHARGE INSTRUCTIONS

## 2018-09-12 NOTE — OP NOTE
ATTENDING PHYSICIAN:  Jermain aClix MD     PRE-PROCEDURE DIAGNOSIS:  Lumbar facet arthropathy  POST-PROCEDURE DIAGNOSIS:  Lumbar facet arthropathy  PROCEDURE:  Left lumbar diagnostic medial branch blocks at the L3, L4, L5, and S1 levels  ESTIMATED BLOOD LOSS:  Minimal     COMPLICATIONS:  None  ANESTHESIA:  Local     LOCATION:  [address]  CONSENT: Today's procedure and its risks, benefits, as well as alternatives were explained to the patient  Risks include but are not limited to bleeding, infection, weakness, nerve irritation or damage, hematoma formation, abscess formation, failure the pain to improve, or potential worsening of the pain  The patient verbalized understanding and wished to proceed with the procedure  Written informed consent was thereby obtained  DESCRIPTION OF PROCEDURE: After written informed consent was obtained, the patient was taken to the fluoroscopy suite and placed in the prone position  Anatomical landmarks were identified with the aid of fluoroscopy in the PA and oblique views  The patient's lumbar region was prepped with antiseptic solution and draped in the usual sterile fashion  Strict aseptic technique was utilized  The skin and subcutaneous tissues at each needle entry site was infiltrated with a small amount of 1% preservative-free lidocaine using a 25-gauge 1-1/2-inch needle  A 25-gauge 3-1/2-inch needle was then incrementally advanced under fluoroscopic guidance in multiple views at each level such that the needle tip was advanced to contact os at the junction of the superior articulating process and the superomedial border of the transverse process at each of the cephalad levels as well as to contact os at the junction of the superior articulating process and the superomedial border of the sacral ala at the S1 level   After negative aspiration was confirmed, 0 5 mL of preservative-free 1% Lidocaine was injected into the cephalad needles and 1 mL of preservative-free 1% Lidocaine was injected into the needles at the S1 level  All needles were removed intact and hemostasis was maintained throughout  The patient tolerated the procedure well and no paresthesias or other complications were reported during or following this procedure  The antiseptic was wiped clean and Band-Aids were placed as appropriate  The patient was transferred to the recovery area and was observed for an appropriate period of time during which the patient remained hemodynamically stable and neurovascularly intact as the patient was prior to the procedure  The patient was subsequently discharged home in stable condition with supervision  The patient was instructed to call the office in a few days with an update  Discharge instructions were provided  I was present for and participated in all key and critical portions of this procedure      Rosendo Mai MD  9/12/2018  1:51 PM

## 2018-09-12 NOTE — H&P (VIEW-ONLY)
Assessment:  1  Chronic left-sided low back pain without sciatica    2  Chronic pain syndrome    3  Lumbar spondylosis        Plan:  My impressions and treatment recommendations were discussed in detail with the patient, who verbalized understanding and had no further questions  Given that the patient has signs and symptoms consistent with left lumbar facet syndrome, discussed the rationale of undergoing left L3, L4, L5, and S1 diagnostic and confirmatory medial branch blocks  The procedures, its risks, and benefits were explained in detail to the patient  Risks include but are not limited to bleeding, infection, hematoma formation, abscess formation, weakness, headache, failure the pain to improve, nerve irritation or damage, and potential worsening of the pain  The patient verbalized understanding and wished to proceed with the procedure  Follow-up is planned with the patient in 4 weeks time or sooner as warranted  Discharge instructions were provided  I personally saw and examined the patient and I agree with the above discussed plan of care  History of Present Illness:    Braxton Philip is a 57 y o  male who presents to [facility] for initial evaluation of the above stated pain complaints  The patient has a past medical and chronic pain history as outlined in the assessment section  He was referred by Dr Leila Manzanares  At today's office visit, the patient's pain score is 7/10 on the verbal numerical pain rating scale  The patient states that his pain is primarily in his left-sided low back with radiation into the left hip  He describes his pain is moderate to severe and nearly constant in nature  He reports that his pain is worse at night  He describes his pain as burning, sharp, dull/aching, and shooting    Patient states that standing decreases pain  Lying down, bending, sitting, walking, exercise, and relaxation increases pain   He reports that physical therapy, exercise, 10s unit treatment, and heat/ice treatment provided moderate pain relief  He also reports that chiropractic manipulation provides moderate pain relief  He is currently using tramadol 25 mg half a tablet daily as needed for pain  He reports that this medicine does give him some pain relief  He is currently employed full-time as an owner of an [employer]  Review of Systems:    Review of Systems   Constitutional: Negative for fever and unexpected weight change  HENT: Negative for trouble swallowing  Eyes: Negative for visual disturbance  Respiratory: Negative for shortness of breath and wheezing  Cardiovascular: Negative for chest pain and palpitations  Gastrointestinal: Negative for constipation, diarrhea, nausea and vomiting  Endocrine: Negative for cold intolerance, heat intolerance and polydipsia  Genitourinary: Positive for difficulty urinating  Negative for frequency  Musculoskeletal: Positive for joint swelling (joint stiffness)  Negative for arthralgias, gait problem and myalgias  Skin: Negative for rash  Neurological: Positive for weakness (muscle weakness)  Negative for dizziness, seizures, syncope and headaches  Hematological: Does not bruise/bleed easily  Psychiatric/Behavioral: Negative for dysphoric mood  All other systems reviewed and are negative  Patient Active Problem List   Diagnosis    Left groin pain    Chronic left-sided low back pain    Chronic pain syndrome    Lumbar spondylosis       Past Medical History:   Diagnosis Date    Sciatica        Past Surgical History:   Procedure Laterality Date    [place] REPAIR ING HERNIA,5+Y/O,REDUCIBL Bilateral 9/6/2017    Procedure: REPAIR HERNIA INGUINAL BILATERAL  WITH MESH;  Surgeon: Leah Lisa MD;  Location: [facility];  Service: General       History reviewed  No pertinent family history  Social History     Occupational History    Not on file       Social History Main Topics    Smoking status: Former Smoker    Smokeless tobacco: Never Used    Alcohol use Yes      Comment: social    Drug use: No    Sexual activity: Not on file         Current Outpatient Prescriptions:     ALPRAZolam (XANAX) 0 25 mg tablet, Take 0 125 mg by mouth daily at bedtime as needed for sleep, Disp: , Rfl:     traMADol (ULTRAM) 50 mg tablet, Take 50 mg by mouth every 8 (eight) hours, Disp: , Rfl:     gabapentin (NEURONTIN) 100 mg capsule, Take 100 mg by mouth daily as needed Unsure of dose, Disp: , Rfl:     gabapentin (NEURONTIN) 300 mg capsule, Take by mouth, Disp: , Rfl:     HYDROcodone-acetaminophen (NORCO) 5-325 mg per tablet, Take 1 tablet by mouth every 4 (four) hours as needed for pain for up to 30 doses Earliest Fill Date: 9/6/17 Max Daily Amount: 6 tablets (Patient not taking: Reported on 8/20/2018 ), Disp: 30 tablet, Rfl: 0    meloxicam (MOBIC) 7 5 mg tablet, Take 1 tablet by mouth daily as needed, Disp: , Rfl:     No Known Allergies    Physical Exam:    /60 (BP Location: Left arm, Patient Position: Sitting, Cuff Size: Standard)   Pulse 73   Resp 18   Ht 5' 9" (1 753 m)   Wt 64 kg (141 lb)   BMI 20 82 kg/m²     Constitutional: normal, well developed, well nourished, alert, in no distress and non-toxic and no overt pain behavior    Eyes: anicteric  HEENT: grossly intact  Neck: supple, symmetric, trachea midline and no masses   Pulmonary:even and unlabored  Cardiovascular:No edema or pitting edema present  Skin:Normal without rashes or lesions and well hydrated  Psychiatric:Mood and affect appropriate  Neurologic:Cranial Nerves II-XII grossly intact  Musculoskeletal:normal     Lumbar Spine Exam    Appearance:  Normal lordosis  Palpation/Tenderness:  no tenderness or spasm  Sensory:  no sensory deficits noted  Range of Motion:  Flexion:  No limitation  without pain  Extension:  Moderately limited  with pain  Lateral Flexion - Left:  No limitation  with pain  Lateral Flexion - Right:  No limitation with pain  Rotation - Left:  No limitation  with pain  Rotation - Right:  No limitation  without pain   Lumbar facet loading is positive on the left and negative on the right  Motor Strength:  Left hip flexion:  5/5  Left hip extension:  5/5  Right hip flexion:  5/5  Right hip extension:  5/5  Left knee flexion:  5/5  Left knee extension:  5/5  Right knee flexion:  5/5  Right knee extension:  5/5  Left foot dorsiflexion:  5/5  Left foot plantar flexion:  5/5  Right foot dorsiflexion:  5/5  Right foot plantar flexion:  5/5  Reflexes:  Left Patellar:  2+   Right Patellar:  2+   Left Achilles:  2+   Right Achilles:  2+   Special Tests:  Left Straight Leg Test:  negative  Right Straight Leg Test:  negative  Left Tam's Maneuver:  negative  Right Tam's Maneuver:  negative    Imaging  No orders to display   12/12/17  MRI LUMBAR SPINE WITHOUT CONTRAST     INDICATION:  M54 9: Dorsalgia, unspecified  M54 5: Low back pain  History taken directly from the electronic ordering system      COMPARISON:  None      TECHNIQUE:  Sagittal T1, sagittal T2, sagittal inversion recovery, axial T1 and axial T2, coronal T2        IMAGE QUALITY:  Diagnostic     FINDINGS:     ALIGNMENT:  Minimal levocurvature  No significant listhesis      MARROW SIGNAL:  Marrow signal is within normal limits without signs of an infiltrative process  No signs of acute fracture or significant marrow edema pattern  Vertebral body heights are maintained        DISTAL CORD AND CONUS:  Normal size and signal within the distal cord and conus  The conus ends at the L1 level      PARASPINAL SOFT TISSUES:  Paraspinal soft tissues are unremarkable      SACRUM:  Normal signal within the sacrum  No evidence of insufficiency or stress fracture      LOWER THORACIC DISC SPACES:  Normal disc height and signal   No disc herniation, canal stenosis or foraminal narrowing      LUMBAR DISC SPACES:          L1-L2:  No significant spinal canal stenosis   No right and no left neural foraminal stenosis       L2-L3:  No significant spinal canal stenosis  No right and no left neural foraminal stenosis      L3-L4:  Circumferential disc bulge  No significant spinal canal stenosis  No right and no left neural foraminal stenosis      L4-L5:  Bulging annulus  Bilateral facet arthropathy  No significant spinal canal stenosis  No right and no left neural foraminal stenosis      L5-S1:  Bilateral facet arthropathy  No significant spinal canal stenosis  No right and no left neural foraminal stenosis      IMPRESSION:     Lumbar spondylosis without significant spinal canal foraminal stenosis        Workstation performed: [id number]      Imaging     MRI lumbar spine wo contrast (Order #[id number]) on 12/13/2017 - Imaging Information    Show result history   Result Comparison   MRI lumbar spine wo contrast (Order [id number])   Newer Version Older Version   Final result    12/14/2017  9:21 AM    Interface, Radiology Results In         This is the newest version No older versions exist   Narrative     MRI LUMBAR SPINE WITHOUT CONTRAST     INDICATION:  M54 9: Dorsalgia, unspecified   M54 5: Low back pain  History taken directly from the electronic ordering system  COMPARISON:  None  TECHNIQUE:  Sagittal T1, sagittal T2, sagittal inversion recovery, axial T1 and axial T2, coronal T2        IMAGE QUALITY:  Diagnostic     FINDINGS:     ALIGNMENT:  Minimal levocurvature   No significant listhesis  MARROW SIGNAL:  Marrow signal is within normal limits without signs of an infiltrative process  No signs of acute fracture or significant marrow edema pattern  Vertebral body heights are maintained        DISTAL CORD AND CONUS:  Normal size and signal within the distal cord and conus   The conus ends at the L1 level  PARASPINAL SOFT TISSUES:  Paraspinal soft tissues are unremarkable  SACRUM:  Normal signal within the sacrum  No evidence of insufficiency or stress fracture       LOWER THORACIC DISC SPACES:  Normal disc height and signal   No disc herniation, canal stenosis or foraminal narrowing  LUMBAR DISC SPACES:          L1-L2:  No significant spinal canal stenosis  No right and no left neural foraminal stenosis  L2-L3:  No significant spinal canal stenosis   No right and no left neural foraminal stenosis  L3-L4:  Circumferential disc bulge   No significant spinal canal stenosis  No right and no left neural foraminal stenosis  L4-L5:  Bulging annulus   Bilateral facet arthropathy   No significant spinal canal stenosis  No right and no left neural foraminal stenosis  L5-S1:  Bilateral facet arthropathy   No significant spinal canal stenosis  No right and no left neural foraminal stenosis  Impression       Lumbar spondylosis without significant spinal canal foraminal stenosis  No orders of the defined types were placed in this encounter

## 2018-09-14 ENCOUNTER — TELEPHONE (OUTPATIENT)
Dept: PAIN MEDICINE | Facility: CLINIC | Age: 48
End: 2018-09-14

## 2018-09-18 ENCOUNTER — TELEPHONE (OUTPATIENT)
Dept: PAIN MEDICINE | Facility: CLINIC | Age: 48
End: 2018-09-18

## 2018-09-21 NOTE — TELEPHONE ENCOUNTER
Spoke to pt regarding scheduling Lt L3 L4 L5 S1 MBB#2  Pt stated he is not interested in scheduling anymore procedures at this time  He said he is trying something different for his pain management  He did not give anymore information  I told him if he changed his mind to please call me back

## 2018-09-25 NOTE — TELEPHONE ENCOUNTER
Pt LM on [employer], asked to come in to discuss RFA with Dr Lawrence Moser- he is scheduled for Tues 10/2 in the afternoon

## 2025-01-17 ENCOUNTER — OFFICE VISIT (OUTPATIENT)
Dept: OBGYN CLINIC | Facility: CLINIC | Age: 55
End: 2025-01-17
Payer: COMMERCIAL

## 2025-01-17 ENCOUNTER — APPOINTMENT (OUTPATIENT)
Dept: RADIOLOGY | Facility: AMBULARY SURGERY CENTER | Age: 55
End: 2025-01-17
Attending: STUDENT IN AN ORGANIZED HEALTH CARE EDUCATION/TRAINING PROGRAM
Payer: COMMERCIAL

## 2025-01-17 VITALS — BODY MASS INDEX: 23.25 KG/M2 | HEIGHT: 69 IN | WEIGHT: 157 LBS

## 2025-01-17 DIAGNOSIS — S29.012A STRAIN OF LATISSIMUS DORSI MUSCLE, INITIAL ENCOUNTER: Primary | ICD-10-CM

## 2025-01-17 DIAGNOSIS — M25.511 RIGHT SHOULDER PAIN, UNSPECIFIED CHRONICITY: ICD-10-CM

## 2025-01-17 PROCEDURE — 73030 X-RAY EXAM OF SHOULDER: CPT

## 2025-01-17 PROCEDURE — 99204 OFFICE O/P NEW MOD 45 MIN: CPT | Performed by: STUDENT IN AN ORGANIZED HEALTH CARE EDUCATION/TRAINING PROGRAM

## 2025-01-17 PROCEDURE — 20610 DRAIN/INJ JOINT/BURSA W/O US: CPT | Performed by: STUDENT IN AN ORGANIZED HEALTH CARE EDUCATION/TRAINING PROGRAM

## 2025-01-17 RX ORDER — TAMSULOSIN HYDROCHLORIDE 0.4 MG/1
0.4 CAPSULE ORAL
COMMUNITY
Start: 2024-10-23

## 2025-01-17 RX ORDER — TRIAMCINOLONE ACETONIDE 40 MG/ML
40 INJECTION, SUSPENSION INTRA-ARTICULAR; INTRAMUSCULAR
Status: COMPLETED | OUTPATIENT
Start: 2025-01-17 | End: 2025-01-17

## 2025-01-17 RX ORDER — BUPIVACAINE HYDROCHLORIDE 2.5 MG/ML
4 INJECTION, SOLUTION INFILTRATION; PERINEURAL
Status: COMPLETED | OUTPATIENT
Start: 2025-01-17 | End: 2025-01-17

## 2025-01-17 RX ORDER — ESCITALOPRAM OXALATE 5 MG/1
5 TABLET ORAL DAILY
COMMUNITY
Start: 2024-10-28

## 2025-01-17 RX ADMIN — BUPIVACAINE HYDROCHLORIDE 4 ML: 2.5 INJECTION, SOLUTION INFILTRATION; PERINEURAL at 13:00

## 2025-01-17 RX ADMIN — TRIAMCINOLONE ACETONIDE 40 MG: 40 INJECTION, SUSPENSION INTRA-ARTICULAR; INTRAMUSCULAR at 13:00

## 2025-01-17 NOTE — PROGRESS NOTES
Ortho Sports Medicine Shoulder New Patient Visit     Assesment:   54 y.o. male right shoulder latissimus dorsi strain, SLAP tear    Plan:    Bry is present in office for evaluation of right shoulder pain. X-rays obtained at today's visit and reviewed with patient in depth. Discussed and recommend conservative treatment with physical therapy and corticosteroid injection. Discussed and recommend massage over the latissimus dorsi and use of OTC pain medication. A right shoulder glenohumeral corticosteroid injection was given without complication and was well tolerated. Would like to see patient back in 6-8 weeks and if he does not have any relief we will possibly obtain an MRI    Conservative treatment:    Ice to shoulder 1-2 times daily, for 20 minutes at a time.  PT for ROM and strengthening to shoulder, rotator cuff, scapular stabilizers.  Home exercise program for shoulder, including ROM and strenthening.  Instructions given to patient of what exercises to perform.      Imaging:    All imaging from today was reviewed by myself and explained to the patient.       Injection:    The risks and benefits of the injection (which include but are not limited to: infection, bleeding,damage to nerve/artery, need for further intervention), as well as the risks and benefits of all alternative treatments were explained and understood.  The patient elected to proceed with injection. The procedure was done with aseptic technique, and the patient tolerated the procedure well with no complications.  A corticosteroid injection of the subacromial space was performed.      Surgery:     No surgery is recommended at this point, continue with conservative treatment plan as noted.      Follow up:    No follow-ups on file.        Chief Complaint   Patient presents with    Right Shoulder - Pain       History of Present Illness:    The patient is a 54 y.o., right hand dominant male seen in clinic for evaluation of right shoulder pain.  He  states that he had a right shoulder injury while playing pickle ball came up for and is felt pain in his latissimus dorsi and deep shoulder posteriorly since.  He has a history of a type III shoulder separation back in 2019 which was treated nonoperatively.  He has not performed any physical therapy or had any corticosteroid injections since his injury this past summer.  He states that with palpation to his latissimus dorsi he has a 10 to 9 out of 10 pain. He has not noticed any diminshed ROM or strength.    .          Shoulder Surgical History:  None    Past Medical, Social and Family History:  Past Medical History:   Diagnosis Date    Sciatica      Past Surgical History:   Procedure Laterality Date    NERVE BLOCK Left 9/12/2018    Procedure: L3 L4 L5 S1 Medial Branch Block #1 ([id number]);  Surgeon: Alina Ayon MD;  Location: [facility] MAIN OR;  Service: Pain Management     MT REPAIR ING HERNIA,5+Y/O,REDUCIBL Bilateral 9/6/2017    Procedure: REPAIR HERNIA INGUINAL BILATERAL  WITH MESH;  Surgeon: Em Mauro MD;  Location: [facility] Main OR;  Service: General     No Known Allergies  Current Outpatient Medications on File Prior to Visit   Medication Sig Dispense Refill    escitalopram (LEXAPRO) 5 mg tablet Take 5 mg by mouth daily      tamsulosin (FLOMAX) 0.4 mg Take 0.4 mg by mouth      [DISCONTINUED] ALPRAZolam (XANAX) 0.25 mg tablet Take 0.125 mg by mouth daily at bedtime as needed for sleep      [DISCONTINUED] gabapentin (NEURONTIN) 100 mg capsule Take 100 mg by mouth daily as needed Unsure of dose      [DISCONTINUED] gabapentin (NEURONTIN) 300 mg capsule Take by mouth      [DISCONTINUED] HYDROcodone-acetaminophen (NORCO) 5-325 mg per tablet Take 1 tablet by mouth every 4 (four) hours as needed for pain for up to 30 doses Earliest Fill Date: 9/6/17 Max Daily Amount: 6 tablets (Patient not taking: Reported on 8/20/2018 ) 30 tablet 0    [DISCONTINUED] meloxicam (MOBIC) 7.5 mg tablet Take 1 tablet by mouth  daily as needed      [DISCONTINUED] traMADol (ULTRAM) 50 mg tablet Take 50 mg by mouth every 8 (eight) hours       No current facility-administered medications on file prior to visit.     Social History     Socioeconomic History    Marital status: /Civil Union     Spouse name: Not on file    Number of children: Not on file    Years of education: Not on file    Highest education level: Not on file   Occupational History    Not on file   Tobacco Use    Smoking status: Former    Smokeless tobacco: Never   Substance and Sexual Activity    Alcohol use: Yes     Comment: social    Drug use: No    Sexual activity: Not on file   Other Topics Concern    Not on file   Social History Narrative    Not on file     Social Drivers of Health     Financial Resource Strain: Low Risk  (1/22/2024)    Received from [facility]    Overall Financial Resource Strain (CARDIA)     Difficulty of Paying Living Expenses: Not hard at all   Food Insecurity: No Food Insecurity (1/22/2024)    Received from [facility]    Hunger Vital Sign     Worried About Running Out of Food in the Last Year: Never true     Ran Out of Food in the Last Year: Never true   Transportation Needs: No Transportation Needs (1/22/2024)    Received from [facility]    PRAPARE - Transportation     Lack of Transportation (Medical): No     Lack of Transportation (Non-Medical): No   Physical Activity: Not on file   Stress: Stress Concern Present (1/22/2024)    Received from [facility]    [employer] - Occupational Stress Questionnaire     Feeling of Stress : To some extent   Social Connections: Feeling Socially Integrated (1/22/2024)    Received from [facility]    OASIS : Social Isolation     How often do you feel lonely or isolated from those around you?: Never   Intimate Partner Violence: Not At Risk (1/22/2024)    Received from [facility]  "Network    Humiliation, Afraid, Rape, and Kick questionnaire     Fear of Current or Ex-Partner: No     Emotionally Abused: No     Physically Abused: No     Sexually Abused: No   Housing Stability: Unknown (1/22/2024)    Received from [facility]    Housing Stability Vital Sign     Unable to Pay for Housing in the Last Year: No     Number of Places Lived in the Last Year: Not on file     Unstable Housing in the Last Year: No         I have reviewed the past medical, surgical, social and family history, medications and allergies as documented in the EMR.    Review of systems: ROS is negative other than that noted in the HPI.  Constitutional: Negative for fatigue and fever.   HENT: Negative for sore throat.    Respiratory: Negative for shortness of breath.    Cardiovascular: Negative for chest pain.   Gastrointestinal: Negative for abdominal pain.   Endocrine: Negative for cold intolerance and heat intolerance.   Genitourinary: Negative for flank pain.   Musculoskeletal: Negative for back pain.   Skin: Negative for rash.   Allergic/Immunologic: Negative for immunocompromised state.   Neurological: Negative for dizziness.   Psychiatric/Behavioral: Negative for agitation.      Physical Exam:    Height 5' 9\" (1.753 m), weight 71.2 kg (157 lb).    General/Constitutional: NAD, well developed, well nourished  HENT: Normocephalic, atraumatic  CV: Intact distal pulses, regular rate  Resp: No respiratory distress or labored breathing  Lymphatic: No lymphadenopathy palpated  Neuro: Alert and Oriented x 3, no focal deficits  Psych: Normal mood, normal affect, normal judgement, normal behavior  Skin: Warm, dry, no rashes, no erythema      Shoulder focused exam:     Visual inspection of the Right shoulder demonstrates normal contour without atrophy  No evidence of scapular dyskinesia or atrophy.  No scapular winging  Active and passive range of motion demonstrates forward flexion to 180, abduction to 90, external " rotation is 45 with the arm the side, internal rotation to lumbar   Rotator cuff strength is 5/5 to resisted forward flexion, 5/5 resisted abduction, 5/5 resisted external rotation, 5/5 resisted internal rotation  No tenderness to palpation at the distal clavicle, AC joint, acromion, or scapular spine  No pain with cross-body adduction  - Neer's test, - modified Patricia impingement test  Negative external rotation lag sign  Negative belly press, negative lift-off  - speed's and Yergason's test  - tenderness to palpation at the bicipital groove  + [place]'s test        UE NV Exam: +2 Radial pulses bilaterally  Sensation intact to light touch C5-T1 bilaterally, Radial/median/ulnar nerve motor intact      Bilateral elbow, wrist, and and forearm ROM full, painless with passive ROM, no ttp or crepitance throughout extremities below shoulder joint    Cervical ROM is full without pain, numbness or tingling      Shoulder Imaging    X-rays of the right shoulder were reviewed, which demonstrate type 3 shoulder separation without significant degenerative changes an no acute osseous abnormality.  I do not currently have a radiology reading from [facility], but will check the result once the reading is performed.      Large joint arthrocentesis: R glenohumeral  [place] Protocol:  Consent: Verbal consent obtained.  Risks and benefits: risks, benefits and alternatives were discussed  Consent given by: patient  Timeout called at: 1/17/2025 1:47 PM.  Patient understanding: patient states understanding of the procedure being performed  Site marked: the operative site was marked  Patient identity confirmed: verbally with patient  Supporting Documentation  Indications: pain   Procedure Details  Location: shoulder - R glenohumeral  Needle size: 22 G  Ultrasound guidance: no  Medications administered: 4 mL bupivacaine 0.25 %; 40 mg triamcinolone acetonide 40 mg/mL    Patient tolerance: patient tolerated the procedure well with no  immediate complications  Dressing:  Sterile dressing applied           Scribe Attestation      I,:  Luda Johnson PA-C am acting as a scribe while in the presence of the attending physician.:       I,:  Nishant Thomas DO personally performed the services described in this documentation    as scribed in my presence.:

## 2025-01-27 ENCOUNTER — EVALUATION (OUTPATIENT)
Dept: PHYSICAL THERAPY | Facility: CLINIC | Age: 55
End: 2025-01-27
Payer: COMMERCIAL

## 2025-01-27 DIAGNOSIS — M25.511 CHRONIC RIGHT SHOULDER PAIN: Primary | ICD-10-CM

## 2025-01-27 DIAGNOSIS — G89.29 CHRONIC RIGHT SHOULDER PAIN: Primary | ICD-10-CM

## 2025-01-27 DIAGNOSIS — S29.012A STRAIN OF LATISSIMUS DORSI MUSCLE, INITIAL ENCOUNTER: ICD-10-CM

## 2025-01-27 PROCEDURE — 97112 NEUROMUSCULAR REEDUCATION: CPT | Performed by: PHYSICAL THERAPIST

## 2025-01-27 PROCEDURE — 97161 PT EVAL LOW COMPLEX 20 MIN: CPT | Performed by: PHYSICAL THERAPIST

## 2025-01-27 NOTE — PROGRESS NOTES
PT Evaluation     Today's date: 2025  Patient name: Bry Mattson  : 1970  MRN: [id number]  Referring provider: Nishant Thomas DO  Dx:   Encounter Diagnosis     ICD-10-CM    1. Chronic right shoulder pain  M25.511     G89.29                      Assessment  Impairments: activity intolerance, impaired physical strength, lacks appropriate home exercise program and pain with function  Symptom irritability: moderate    Assessment details: Bry Mattson is a pleasant 54 y.o. male who presents with chronic R shoulder pain. Symptoms began over the summer acutely during an overhead swinging movement while playing pickleball. Since this time he has continued to experience shoulder pain mostly with overhead swinging while playing pickleball. Pain has been somewhat better recently, as he has been doing some stretching and foam rolling  to help his shoulder. Physical exam is consistent with RTC tendinopathy. During physical exam there is no reproduction of symptoms with labral provocation testing, there are no findings indicative of full thickness RTC tear. Symptoms are most readily produced with empty can test. Patient was referred with suspicion of latissimus dorsi muscle strain. I am suspicious this may be a contributing factor, based on symptoms with overhead movement and location of TTP. Lat muscle stretching and active insufficiency contraction do no elicit symptoms, so this is not my primary differential. Primary movement impairment diagnosis of periscapular neuromuscular control deficit, RTC loading insufficiency. I discussed exam findings at length with patient. I outlined an initial HEP, patient demonstrates appropriate understanding. No further referral appears necessary at this time based upon examination results. Pt. will benefit from skilled PT services to help return patient to status at [facility].             Understanding of Dx/Px/POC: good     Prognosis: good    Goals  Impairment based goals  Patient  will achieve >4/5 UE MMT.   Patient will perform empty can test without pain.     Function based goals  Patient will be independent in comprehensive HEP upon discharge.  Patient will achieve goal FOTO score upon discharge.  Patient will return to [employer] at [facility] [employer].     Plan  Patient would benefit from: skilled physical therapy    Planned therapy interventions: activity modification, manual therapy, motor coordination training, neuromuscular re-education, patient education, self care, therapeutic activities, therapeutic exercise, graded activity, home exercise program, graded exercise, functional ROM exercises and strengthening    Frequency: 1-2x week  Duration in weeks: 8  Plan of Care expiration date: 3/28/2025  Treatment plan discussed with: patient        Subjective    HPI: R shoulder pain began over the summer while playing pickleball. During an overhead swing he felt an acute onset of pain under the R arm. Pain has been local to 1 spot primarily. He has tried periods of rest and ice to help, without significant relief. He has tired to stretching and using a foam roller; feels that this has been somewhat helpful. He has continued to play pickleball with some modifications, where he avoids overhead swinging. He experienced a similar symptom years ago while trying to throw a ball as hard as possible, self resolved after approx 2 years. Denies RUE parasthesias. Hx of AC joint separation 5 years ago.    Pain Location: posterior inferior aspect of the R shoulder; 1/10 current; 5/10 worst    Objective    Upper Quarter Screen  Myotomes:  Strength WNL B/L    Dermatomes:   Sensation WNL B/L    Palpation: TTP inferior angle scapula; TTP     Scapular position: WNL B/L    Scapular mobility:  Flexion: mid axillary line  Abduction: mid axillary line    Shoulder Clinical Tests:  Patricia Collins: neg  Infraspinatus: neg  Painful Arc: neg  Empty Can test: pos  Crank Test: neg    Biceps Load I: neg  Biceps Load II:  neg  Efraín's: neg  Lat muscle length test: neg       Manual Muscle Testing:  Shoulder ER @ 0:   R 4+/5  Shoulder IR @ 0:   R 4+/5  Shoulder ER @ 90:   R 4/5  Shoulder IR @ 90:   R 4/5  Serratus Anterior:   R 4/5  Lower Trapezius:    R 3+/5  Middle Trapezius:    R 3+/5  Rhomboids:     R 3+/5      Shoulder Active Range of Motion: full all planes          Pertinent Findings:      POC End Date: 3/28/25                                                                                          Test / Measure  1/27/2025     FOTO (Predicted 72) 60   UE MMT 3+/5 periscapular   Empty can test pos         Access Code: [email or web address]  URL: [email or web address]  Date: 01/27/2025  Prepared by: Angelo Rao    Exercises  - Prone Shoulder Horizontal Abduction  - 3 sets - 10 reps  - Prone Single Arm Shoulder Y  - 3 sets - 10 reps  - Prone Single Arm Shoulder Horizontal Abduction with Scapular Retraction and Palm Down  - 3 sets - 10 reps  - Prone Shoulder Extension - Single Arm  - 3 sets - 10 reps  - Shoulder External Rotation with Anchored Resistance with Towel Under Elbow  - 3 sets - 10 reps  - Shoulder Internal Rotation with Resistance  - 3 sets - 10 reps    Precautions: n/a      Manuals 1/27                                                                Neuro Re-Ed             RTC 3 way press             Serratus wall slide                                                                 Education HEP and POC            Ther Ex             ER @ 90             IR @ 90             Quadruped shoulder taps                                                                              Ther Activity                                       Gait Training                                       Modalities

## 2025-06-19 ENCOUNTER — TELEPHONE (OUTPATIENT)
Age: 55
End: 2025-06-19

## 2025-06-19 NOTE — TELEPHONE ENCOUNTER
Caller: Bry ANTUNEZ    Doctor: Dr. Purvis    Reason for call: Pt will have records sent over for the doctor to review Once received scan into [facility]    Call back#: [phone]

## 2025-08-08 ENCOUNTER — CONSULT (OUTPATIENT)
Dept: PAIN MEDICINE | Facility: CLINIC | Age: 55
End: 2025-08-08
Payer: COMMERCIAL

## 2025-08-08 VITALS — HEIGHT: 69 IN | BODY MASS INDEX: 20.88 KG/M2 | WEIGHT: 141 LBS

## 2025-08-08 DIAGNOSIS — G89.29 CHRONIC LEFT-SIDED LOW BACK PAIN WITH LEFT-SIDED SCIATICA: ICD-10-CM

## 2025-08-08 DIAGNOSIS — M54.42 CHRONIC LEFT-SIDED LOW BACK PAIN WITH LEFT-SIDED SCIATICA: ICD-10-CM

## 2025-08-08 DIAGNOSIS — M47.816 LUMBAR SPONDYLOSIS: Primary | ICD-10-CM

## 2025-08-08 PROCEDURE — 99204 OFFICE O/P NEW MOD 45 MIN: CPT | Performed by: ANESTHESIOLOGY

## (undated) DEVICE — WIPES BABY PAMPERS SENSITIVE 36/PK

## (undated) DEVICE — 2963 MEDIPORE SOFT CLOTH TAPE 3 IN X 10 YD 12 RLS/CS: Brand: 3M™ MEDIPORE™

## (undated) DEVICE — TOWEL SET X-RAY

## (undated) DEVICE — GLOVE SRG BIOGEL 6.5

## (undated) DEVICE — PLUMEPEN PRO 10FT

## (undated) DEVICE — UNIVERSAL BLOCK TRAY: Brand: INTEGRA®

## (undated) DEVICE — REM POLYHESIVE ADULT PATIENT RETURN ELECTRODE: Brand: VALLEYLAB

## (undated) DEVICE — PLASTIC ADHESIVE BANDAGE: Brand: CURITY

## (undated) DEVICE — SCROTAL SUPPORT MED

## (undated) DEVICE — INTENDED FOR TISSUE SEPARATION, AND OTHER PROCEDURES THAT REQUIRE A SHARP SURGICAL BLADE TO PUNCTURE OR CUT.: Brand: BARD-PARKER SAFETY BLADES SIZE 15, STERILE

## (undated) DEVICE — SUT PDS II 0 CT-1 27 IN Z340H

## (undated) DEVICE — SUT MONOCRYL 4-0 PS-2 27 IN Y426H

## (undated) DEVICE — CHLORAPREP APPLICATOR TINTED 10.5ML ONE-STEP

## (undated) DEVICE — GLOVE SRG BIOGEL 7.5

## (undated) DEVICE — NEEDLE SPINAL 25G X 3.5 IN QUINCKE

## (undated) DEVICE — SUT VICRYL 2-0 SH 27 IN UNDYED J417H

## (undated) DEVICE — 3M™ STERI-STRIP™ COMPOUND BENZOIN TINCTURE 40 BAGS/CARTON 4 CARTONS/CASE C1544: Brand: 3M™ STERI-STRIP™

## (undated) DEVICE — SCD SEQUENTIAL COMPRESSION COMFORT SLEEVE MEDIUM KNEE LENGTH: Brand: KENDALL SCD

## (undated) DEVICE — DRESSING MEPILEX AG BORDER 4 X 4 IN

## (undated) DEVICE — GLOVE INDICATOR PI UNDERGLOVE SZ 7 BLUE

## (undated) DEVICE — NEEDLE BLUNT 18 G X 1 1/2 W FILTER

## (undated) DEVICE — 3M™ STERI-STRIP™ REINFORCED ADHESIVE SKIN CLOSURES, R1547, 1/2 IN X 4 IN (12 MM X 100 MM), 6 STRIPS/ENVELOPE: Brand: 3M™ STERI-STRIP™

## (undated) DEVICE — GLOVE INDICATOR PI UNDERGLOVE SZ 6.5 BLUE

## (undated) DEVICE — SYRINGE 5ML LL

## (undated) DEVICE — MEDI-VAC YANK SUCT HNDL W/TPRD BULBOUS TIP: Brand: CARDINAL HEALTH

## (undated) DEVICE — SUT VICRYL 3-0 SH 27 IN J416H

## (undated) DEVICE — STRL UNIVERSAL MINOR GENERAL: Brand: CARDINAL HEALTH

## (undated) DEVICE — RADIOLOGY STERILE LABELS: Brand: CENTURION

## (undated) DEVICE — TUBING SUCTION 5MM X 12 FT

## (undated) DEVICE — STRL PENROSE DRAIN 18" X 1/2": Brand: CARDINAL HEALTH

## (undated) DEVICE — NEEDLE 25G X 1 1/2

## (undated) DEVICE — SMALL NEEDLE COUNTER NEST

## (undated) DEVICE — CHLORAPREP HI-LITE 26ML ORANGE

## (undated) DEVICE — SUT PDS II 0 CT-2 27 IN Z334H

## (undated) DEVICE — TELFA NON-ADHERENT ABSORBENT DRESSING: Brand: TELFA